# Patient Record
Sex: FEMALE | Race: WHITE | Employment: OTHER | URBAN - METROPOLITAN AREA
[De-identification: names, ages, dates, MRNs, and addresses within clinical notes are randomized per-mention and may not be internally consistent; named-entity substitution may affect disease eponyms.]

---

## 2019-03-01 LAB — MAMMOGRAPHY, EXTERNAL: NORMAL

## 2022-08-24 LAB — DIABETIC RETINOPATHY: POSITIVE

## 2022-10-22 LAB
ALBUMIN SERPL-MCNC: NORMAL G/DL
ALP BLD-CCNC: NORMAL U/L
ALT SERPL-CCNC: NORMAL U/L
ANION GAP SERPL CALCULATED.3IONS-SCNC: NORMAL MMOL/L
AST SERPL-CCNC: NORMAL U/L
AVERAGE GLUCOSE: 151
BILIRUB SERPL-MCNC: NORMAL MG/DL
BUN BLDV-MCNC: NORMAL MG/DL
CALCIUM SERPL-MCNC: NORMAL MG/DL
CHLORIDE BLD-SCNC: NORMAL MMOL/L
CHOLESTEROL, TOTAL: 115 MG/DL
CHOLESTEROL/HDL RATIO: NORMAL
CO2: NORMAL
CREAT SERPL-MCNC: NORMAL MG/DL
EGFR: 16.8
GLUCOSE BLD-MCNC: NORMAL MG/DL
HBA1C MFR BLD: 6.9 %
HDLC SERPL-MCNC: 39 MG/DL (ref 35–70)
LDL CHOLESTEROL CALCULATED: 46 MG/DL (ref 0–160)
NONHDLC SERPL-MCNC: NORMAL MG/DL
POTASSIUM SERPL-SCNC: NORMAL MMOL/L
SODIUM BLD-SCNC: NORMAL MMOL/L
TOTAL PROTEIN: NORMAL
TRIGL SERPL-MCNC: 147 MG/DL
VLDLC SERPL CALC-MCNC: NORMAL MG/DL

## 2023-01-02 LAB
ALBUMIN SERPL-MCNC: NORMAL G/DL
ALP BLD-CCNC: NORMAL U/L
ALT SERPL-CCNC: NORMAL U/L
ANION GAP SERPL CALCULATED.3IONS-SCNC: NORMAL MMOL/L
AST SERPL-CCNC: NORMAL U/L
BASOPHILS ABSOLUTE: ABNORMAL
BASOPHILS RELATIVE PERCENT: ABNORMAL
BILIRUB SERPL-MCNC: NORMAL MG/DL
BUN BLDV-MCNC: NORMAL MG/DL
CALCIUM SERPL-MCNC: NORMAL MG/DL
CHLORIDE BLD-SCNC: NORMAL MMOL/L
CO2: NORMAL
CREAT SERPL-MCNC: NORMAL MG/DL
EOSINOPHILS ABSOLUTE: ABNORMAL
EOSINOPHILS RELATIVE PERCENT: ABNORMAL
FERRITIN: 468.9 NG/ML (ref 9–150)
GFR SERPL CREATININE-BSD FRML MDRD: 16 ML/MIN/{1.73_M2}
GLUCOSE BLD-MCNC: NORMAL MG/DL
HCT VFR BLD CALC: 32.9 % (ref 36–46)
HEMOGLOBIN: 11.3 G/DL (ref 12–16)
LYMPHOCYTES ABSOLUTE: ABNORMAL
LYMPHOCYTES RELATIVE PERCENT: ABNORMAL
MCH RBC QN AUTO: ABNORMAL PG
MCHC RBC AUTO-ENTMCNC: ABNORMAL G/DL
MCV RBC AUTO: ABNORMAL FL
MONOCYTES ABSOLUTE: ABNORMAL
MONOCYTES RELATIVE PERCENT: ABNORMAL
NEUTROPHILS ABSOLUTE: ABNORMAL
NEUTROPHILS RELATIVE PERCENT: ABNORMAL
PDW BLD-RTO: ABNORMAL %
PLATELET # BLD: ABNORMAL 10*3/UL
PMV BLD AUTO: ABNORMAL FL
POTASSIUM SERPL-SCNC: NORMAL MMOL/L
RBC # BLD: 3.55 10^6/ΜL
SODIUM BLD-SCNC: NORMAL MMOL/L
TOTAL PROTEIN: NORMAL
WBC # BLD: 8.4 10^3/ML

## 2023-01-24 ENCOUNTER — OFFICE VISIT (OUTPATIENT)
Dept: PRIMARY CARE CLINIC | Age: 68
End: 2023-01-24

## 2023-01-24 VITALS
TEMPERATURE: 97.4 F | DIASTOLIC BLOOD PRESSURE: 84 MMHG | HEART RATE: 96 BPM | WEIGHT: 199.2 LBS | SYSTOLIC BLOOD PRESSURE: 128 MMHG | OXYGEN SATURATION: 97 % | HEIGHT: 61 IN | BODY MASS INDEX: 37.61 KG/M2

## 2023-01-24 DIAGNOSIS — N18.4 STAGE 4 CHRONIC KIDNEY DISEASE (HCC): ICD-10-CM

## 2023-01-24 DIAGNOSIS — J44.9 CHRONIC OBSTRUCTIVE PULMONARY DISEASE, UNSPECIFIED COPD TYPE (HCC): ICD-10-CM

## 2023-01-24 DIAGNOSIS — N18.4 TYPE 2 DIABETES MELLITUS WITH STAGE 4 CHRONIC KIDNEY DISEASE, WITH LONG-TERM CURRENT USE OF INSULIN (HCC): ICD-10-CM

## 2023-01-24 DIAGNOSIS — Z76.89 ENCOUNTER TO ESTABLISH CARE WITH NEW DOCTOR: Primary | ICD-10-CM

## 2023-01-24 DIAGNOSIS — E11.319 DIABETIC RETINOPATHY ASSOCIATED WITH TYPE 2 DIABETES MELLITUS, MACULAR EDEMA PRESENCE UNSPECIFIED, UNSPECIFIED LATERALITY, UNSPECIFIED RETINOPATHY SEVERITY (HCC): ICD-10-CM

## 2023-01-24 DIAGNOSIS — I50.32 CHRONIC CONGESTIVE HEART FAILURE WITH LEFT VENTRICULAR DIASTOLIC DYSFUNCTION (HCC): ICD-10-CM

## 2023-01-24 DIAGNOSIS — Z85.3 HISTORY OF RIGHT BREAST CANCER: ICD-10-CM

## 2023-01-24 DIAGNOSIS — Z23 NEED FOR IMMUNIZATION AGAINST INFLUENZA: ICD-10-CM

## 2023-01-24 DIAGNOSIS — Z95.828 PORT-A-CATH IN PLACE: ICD-10-CM

## 2023-01-24 DIAGNOSIS — Z79.4 TYPE 2 DIABETES MELLITUS WITH STAGE 4 CHRONIC KIDNEY DISEASE, WITH LONG-TERM CURRENT USE OF INSULIN (HCC): ICD-10-CM

## 2023-01-24 DIAGNOSIS — E11.22 TYPE 2 DIABETES MELLITUS WITH STAGE 4 CHRONIC KIDNEY DISEASE, WITH LONG-TERM CURRENT USE OF INSULIN (HCC): ICD-10-CM

## 2023-01-24 DIAGNOSIS — E66.01 CLASS 2 SEVERE OBESITY DUE TO EXCESS CALORIES WITH SERIOUS COMORBIDITY AND BODY MASS INDEX (BMI) OF 37.0 TO 37.9 IN ADULT (HCC): ICD-10-CM

## 2023-01-24 DIAGNOSIS — I25.10 CORONARY ARTERY DISEASE INVOLVING NATIVE CORONARY ARTERY OF NATIVE HEART WITHOUT ANGINA PECTORIS: ICD-10-CM

## 2023-01-24 DIAGNOSIS — G47.33 OSA ON CPAP: ICD-10-CM

## 2023-01-24 DIAGNOSIS — Z99.89 OSA ON CPAP: ICD-10-CM

## 2023-01-24 DIAGNOSIS — E11.42 DIABETIC PERIPHERAL NEUROPATHY (HCC): ICD-10-CM

## 2023-01-24 DIAGNOSIS — Z12.31 SCREENING MAMMOGRAM FOR BREAST CANCER: ICD-10-CM

## 2023-01-24 PROBLEM — K55.059 ACUTE VASCULAR INSUFFICIENCY OF INTESTINE (HCC): Status: RESOLVED | Noted: 2023-01-24 | Resolved: 2023-01-24

## 2023-01-24 PROBLEM — H40.9 GLAUCOMA: Status: ACTIVE | Noted: 2023-01-24

## 2023-01-24 PROBLEM — D50.9 CHRONIC IRON DEFICIENCY ANEMIA: Status: ACTIVE | Noted: 2023-01-24

## 2023-01-24 PROBLEM — E66.9 OBESITY: Status: ACTIVE | Noted: 2023-01-24

## 2023-01-24 PROBLEM — H90.3 SENSORY HEARING LOSS, BILATERAL: Status: ACTIVE | Noted: 2023-01-24

## 2023-01-24 PROBLEM — E07.9 DISORDER OF THYROID GLAND: Status: ACTIVE | Noted: 2023-01-24

## 2023-01-24 PROBLEM — G47.00 INSOMNIA: Status: ACTIVE | Noted: 2023-01-24

## 2023-01-24 PROBLEM — E03.9 ACQUIRED HYPOTHYROIDISM: Status: ACTIVE | Noted: 2023-01-24

## 2023-01-24 PROBLEM — E53.8 COBALAMIN DEFICIENCY: Status: ACTIVE | Noted: 2023-01-24

## 2023-01-24 PROBLEM — S83.249A TEAR OF MEDIAL MENISCUS OF KNEE: Status: ACTIVE | Noted: 2017-06-19

## 2023-01-24 PROBLEM — M19.90 ARTHRITIS: Status: ACTIVE | Noted: 2023-01-24

## 2023-01-24 PROBLEM — R55 SYNCOPE: Status: ACTIVE | Noted: 2022-12-05

## 2023-01-24 PROBLEM — E11.9 TYPE 2 DIABETES MELLITUS (HCC): Status: ACTIVE | Noted: 2023-01-24

## 2023-01-24 PROBLEM — H25.9 AGE-RELATED CATARACT: Status: ACTIVE | Noted: 2023-01-24

## 2023-01-24 PROBLEM — K57.30 DIVERTICULOSIS OF LARGE INTESTINE WITHOUT PERFORATION OR ABSCESS WITHOUT BLEEDING: Status: ACTIVE | Noted: 2023-01-24

## 2023-01-24 PROBLEM — C50.919 MALIGNANT TUMOR OF BREAST (HCC): Status: ACTIVE | Noted: 2023-01-24

## 2023-01-24 PROBLEM — S82.001A CLOSED FRACTURE OF RIGHT PATELLA: Status: ACTIVE | Noted: 2022-12-12

## 2023-01-24 PROBLEM — H25.9 AGE-RELATED CATARACT: Status: RESOLVED | Noted: 2023-01-24 | Resolved: 2023-01-24

## 2023-01-24 PROBLEM — K55.059 ACUTE VASCULAR INSUFFICIENCY OF INTESTINE (HCC): Status: ACTIVE | Noted: 2023-01-24

## 2023-01-24 PROBLEM — Z97.3 WEARS GLASSES: Status: ACTIVE | Noted: 2022-12-05

## 2023-01-24 PROBLEM — E66.812 CLASS 2 SEVERE OBESITY DUE TO EXCESS CALORIES WITH SERIOUS COMORBIDITY AND BODY MASS INDEX (BMI) OF 37.0 TO 37.9 IN ADULT: Status: ACTIVE | Noted: 2023-01-24

## 2023-01-24 PROBLEM — I50.30 CONGESTIVE HEART FAILURE WITH LEFT VENTRICULAR DIASTOLIC DYSFUNCTION (HCC): Status: ACTIVE | Noted: 2022-09-20

## 2023-01-24 PROBLEM — S82.001A CLOSED FRACTURE OF RIGHT PATELLA: Status: RESOLVED | Noted: 2022-12-12 | Resolved: 2023-01-24

## 2023-01-24 PROBLEM — E78.49 OTHER HYPERLIPIDEMIA: Status: ACTIVE | Noted: 2023-01-24

## 2023-01-24 PROBLEM — E87.6 HYPOKALEMIA: Status: ACTIVE | Noted: 2023-01-11

## 2023-01-24 PROBLEM — S83.249A TEAR OF MEDIAL MENISCUS OF KNEE: Status: RESOLVED | Noted: 2017-06-19 | Resolved: 2023-01-24

## 2023-01-24 PROBLEM — K57.30 DIVERTICULOSIS OF LARGE INTESTINE WITHOUT PERFORATION OR ABSCESS WITHOUT BLEEDING: Status: RESOLVED | Noted: 2023-01-24 | Resolved: 2023-01-24

## 2023-01-24 PROBLEM — I25.118 CORONARY ARTERY DISEASE OF NATIVE ARTERY OF NATIVE HEART WITH STABLE ANGINA PECTORIS (HCC): Status: ACTIVE | Noted: 2023-01-24

## 2023-01-24 RX ORDER — LORAZEPAM 0.5 MG/1
0.5 TABLET ORAL
COMMUNITY
Start: 2020-11-03

## 2023-01-24 RX ORDER — DULOXETIN HYDROCHLORIDE 60 MG/1
CAPSULE, DELAYED RELEASE ORAL
COMMUNITY
Start: 2020-09-29

## 2023-01-24 RX ORDER — POTASSIUM CHLORIDE 1500 MG/1
TABLET, FILM COATED, EXTENDED RELEASE ORAL
COMMUNITY
Start: 2023-01-18

## 2023-01-24 RX ORDER — AMLODIPINE BESYLATE 10 MG/1
TABLET ORAL
COMMUNITY
Start: 2021-02-22

## 2023-01-24 RX ORDER — ISOSORBIDE MONONITRATE 60 MG/1
TABLET, EXTENDED RELEASE ORAL
COMMUNITY
Start: 2009-11-25

## 2023-01-24 RX ORDER — ICOSAPENT ETHYL 1000 MG/1
CAPSULE ORAL
COMMUNITY
Start: 2019-10-25

## 2023-01-24 RX ORDER — VITAMINS B1,B2,B3,B5,AND B6 100-2MG/ML
VIAL (ML) INJECTION
COMMUNITY

## 2023-01-24 RX ORDER — HYDRALAZINE HYDROCHLORIDE 50 MG/1
TABLET, FILM COATED ORAL
COMMUNITY
Start: 2021-02-22

## 2023-01-24 RX ORDER — ASPIRIN 81 MG/1
81 TABLET, CHEWABLE ORAL
COMMUNITY
Start: 2020-10-06

## 2023-01-24 RX ORDER — RANOLAZINE 500 MG/1
TABLET, EXTENDED RELEASE ORAL
COMMUNITY
Start: 2020-09-29

## 2023-01-24 RX ORDER — INSULIN HUMAN 500 [IU]/ML
INJECTION, SOLUTION SUBCUTANEOUS
COMMUNITY

## 2023-01-24 RX ORDER — BRIMONIDINE TARTRATE 2 MG/ML
SOLUTION/ DROPS OPHTHALMIC
COMMUNITY

## 2023-01-24 RX ORDER — SPIRONOLACTONE 25 MG/1
TABLET ORAL
COMMUNITY

## 2023-01-24 RX ORDER — ATORVASTATIN CALCIUM 40 MG/1
TABLET, FILM COATED ORAL
COMMUNITY

## 2023-01-24 RX ORDER — ACETAMINOPHEN 325 MG/1
TABLET ORAL
COMMUNITY
Start: 2019-11-01

## 2023-01-24 RX ORDER — FUROSEMIDE 20 MG/1
TABLET ORAL
COMMUNITY

## 2023-01-24 RX ORDER — NITROGLYCERIN 0.4 MG/1
TABLET SUBLINGUAL
COMMUNITY

## 2023-01-24 RX ORDER — LATANOPROST 50 UG/ML
SOLUTION/ DROPS OPHTHALMIC
COMMUNITY
Start: 2020-09-29

## 2023-01-24 RX ORDER — LEVOTHYROXINE SODIUM 88 UG/1
TABLET ORAL
COMMUNITY
Start: 2021-02-12

## 2023-01-24 RX ORDER — CARVEDILOL 25 MG/1
TABLET ORAL
COMMUNITY
Start: 2021-02-22

## 2023-01-24 RX ORDER — FLASH GLUCOSE SENSOR
KIT MISCELLANEOUS
COMMUNITY
Start: 2022-12-08

## 2023-01-24 SDOH — ECONOMIC STABILITY: FOOD INSECURITY: WITHIN THE PAST 12 MONTHS, YOU WORRIED THAT YOUR FOOD WOULD RUN OUT BEFORE YOU GOT MONEY TO BUY MORE.: NEVER TRUE

## 2023-01-24 SDOH — ECONOMIC STABILITY: FOOD INSECURITY: WITHIN THE PAST 12 MONTHS, THE FOOD YOU BOUGHT JUST DIDN'T LAST AND YOU DIDN'T HAVE MONEY TO GET MORE.: NEVER TRUE

## 2023-01-24 ASSESSMENT — PATIENT HEALTH QUESTIONNAIRE - PHQ9
1. LITTLE INTEREST OR PLEASURE IN DOING THINGS: 1
SUM OF ALL RESPONSES TO PHQ QUESTIONS 1-9: 1
2. FEELING DOWN, DEPRESSED OR HOPELESS: 0
SUM OF ALL RESPONSES TO PHQ QUESTIONS 1-9: 1
SUM OF ALL RESPONSES TO PHQ QUESTIONS 1-9: 1
SUM OF ALL RESPONSES TO PHQ9 QUESTIONS 1 & 2: 1
SUM OF ALL RESPONSES TO PHQ QUESTIONS 1-9: 1

## 2023-01-24 ASSESSMENT — SOCIAL DETERMINANTS OF HEALTH (SDOH): HOW HARD IS IT FOR YOU TO PAY FOR THE VERY BASICS LIKE FOOD, HOUSING, MEDICAL CARE, AND HEATING?: NOT VERY HARD

## 2023-01-24 NOTE — Clinical Note
PCP records [address] [facility] from [facility] 1/2/23

## 2023-01-24 NOTE — PROGRESS NOTES
NEW PRIMARY CARE VISIT    23  Name: Yomi Fair   : 1955   Age: 79 y.o.   Sex: female        Assessment & Plan:     Problem List Items Addressed This Visit          Circulatory    Congestive heart failure with left ventricular diastolic dysfunction (HCC)     Euvolemic on exam  Follows with cardio, requested records  Continue carvedilol, Lasix, hydralazine, Imdur, spironolactone         Relevant Medications    atorvastatin (LIPITOR) 40 MG tablet    carvedilol (COREG) 25 MG tablet    hydrALAZINE (APRESOLINE) 50 MG tablet    Icosapent Ethyl (VASCEPA) 1 g CAPS capsule    isosorbide mononitrate (IMDUR) 60 MG extended release tablet    aspirin 81 MG chewable tablet    nitroGLYCERIN (NITROSTAT) 0.4 MG SL tablet    ranolazine (RANEXA) 500 MG extended release tablet    spironolactone (ALDACTONE) 25 MG tablet    amLODIPine (NORVASC) 10 MG tablet    furosemide (LASIX) 20 MG tablet    Coronary artery disease involving native coronary artery of native heart without angina pectoris     Follows with cardio  Continue carvedilol, Imdur, Ranexa, atorvastatin, Vascepa         Relevant Medications    atorvastatin (LIPITOR) 40 MG tablet    carvedilol (COREG) 25 MG tablet    hydrALAZINE (APRESOLINE) 50 MG tablet    Icosapent Ethyl (VASCEPA) 1 g CAPS capsule    isosorbide mononitrate (IMDUR) 60 MG extended release tablet    aspirin 81 MG chewable tablet    nitroGLYCERIN (NITROSTAT) 0.4 MG SL tablet    ranolazine (RANEXA) 500 MG extended release tablet    spironolactone (ALDACTONE) 25 MG tablet    amLODIPine (NORVASC) 10 MG tablet    furosemide (LASIX) 20 MG tablet       Respiratory    PATTIE on CPAP     Benefits from CPAP  Requested records for previous settings  New CPAP order placed         Relevant Orders    DME Order for CPAP as OP    Chronic obstructive lung disease ([facility]     Controlled  Does not require treatment at this time            Endocrine    Type 2 diabetes mellitus with stage 4 chronic kidney disease, with long-term current use of insulin (HCC)     Chronic, controlled however with multiple complications  Follows with endo, requested records  Continue insulin per endo         Relevant Medications    insulin regular human (HUMULIN R) 500 UNIT/ML concentrated injection vial    Diabetic retinopathy associated with type 2 diabetes mellitus ([facility]     Follows with ophtho, requested records         Relevant Medications    insulin regular human (HUMULIN R) 500 UNIT/ML concentrated injection vial    Diabetic peripheral neuropathy ([facility]     Follows with podiatry, requested records         Relevant Medications    insulin regular human (HUMULIN R) 500 UNIT/ML concentrated injection vial    LORazepam (ATIVAN) 0.5 MG tablet    DULoxetine (CYMBALTA) 60 MG extended release capsule       Genitourinary    Stage 4 chronic kidney disease ([facility]     Follows with nephro, requested records            Other    Class 2 severe obesity due to excess calories with serious comorbidity and body mass index (BMI) of 37.0 to 37.9 in adult ([facility]     Continues to work on lowering weight         Relevant Medications    insulin regular human (HUMULIN R) 500 UNIT/ML concentrated injection vial    History of right breast cancer    Relevant Orders    FAITH LALITHA DIGITAL SCREEN BILATERAL PER PROTOCOL    Port-A-Cath in place     Power Port right chest          Other Visit Diagnoses       Encounter to establish care with new doctor    -  Primary    History reviewed and updated    Screening mammogram for breast cancer        Relevant Orders    FAITH LALITHA DIGITAL SCREEN BILATERAL PER PROTOCOL    Need for immunization against influenza        Relevant Orders    Influenza, FLUAD, (age 72 y+), IM, PF, 0.5 mL (Completed)          Counseled patient regarding above diagnosis, including possible risks and complications, especially if left uncontrolled.   Counseled patient as appropriate and relevant regarding any possible side effects, risks, and alternatives to treatment; the patient verbalizes understanding, and is in agreement with the plan as detailed above. All educational materials and instructions were discussed and included on the After Visit Summary. All questions answered to the patient's satisfaction. The patient was advised to call for any concerns prior to next appointment. Return in about 3 months (around 4/24/2023). Subjective:     Chief Complaint   Patient presents with    New Patient     No complaints or concerns shared. She did break her knee cap 4 weeks ago      Patient needs new PCP. Denies concerns today. Review of Systems   Constitutional:  Negative for diaphoresis and fatigue. Eyes:  Negative for visual disturbance. Respiratory:  Negative for cough and shortness of breath. Cardiovascular:  Negative for chest pain, palpitations and leg swelling. Endocrine: Negative for polydipsia and polyuria. Musculoskeletal:  Positive for arthralgias (right knee). Neurological:  Positive for light-headedness. Negative for weakness, numbness and headaches. Medical History:   History reviewed and updated as needed.     Patient Active Problem List   Diagnosis    Wears glasses    Syncope    Sensory hearing loss, bilateral    PATTIE on CPAP    Other hyperlipidemia    Class 2 severe obesity due to excess calories with serious comorbidity and body mass index (BMI) of 37.0 to 37.9 in adult [facility])    History of right breast cancer    Insomnia    Hypokalemia    Glaucoma    Essential hypertension    Esophageal reflux    Type 2 diabetes mellitus with stage 4 chronic kidney disease, with long-term current use of insulin (HCC)    Acquired hypothyroidism    Congestive heart failure with left ventricular diastolic dysfunction (HCC)    B12 deficiency    Closed fracture of right patella    Chronic obstructive lung disease (HCC)    Stage 4 chronic kidney disease (HCC)    Chronic iron deficiency anemia    Arthritis    Coronary artery disease of native artery of native heart with stable angina pectoris ([facility]    Diabetic retinopathy associated with type 2 diabetes mellitus ([facility]    Diabetic peripheral neuropathy ([facility]      Past Medical History:   Diagnosis Date    Acute vascular insufficiency of intestine ([facility] 01/24/2023    Age-related cataract 01/24/2023    Closed fracture of right femur (HCC)     Closed fracture of right patella 12/12/2022    Diverticulosis of large intestine without perforation or abscess without bleeding 01/24/2023    Fracture of right toe     Gouty arthropathy 09/03/2008    Malignant neoplasm of breast ([facility] 1989    right    Nasal bone fracture     Tear of medial meniscus of right knee 06/19/2017     Past Surgical History:   Procedure Laterality Date    CARPAL TUNNEL RELEASE Bilateral     CATARACT EXTRACTION W/ INTRAOCULAR LENS  IMPLANT, BILATERAL Bilateral     FEMUR FRACTURE SURGERY Right     HYSTERECTOMY, TOTAL ABDOMINAL (CERVIX REMOVED)      AUB-benign    MASTECTOMY Right     TONSILLECTOMY AND ADENOIDECTOMY       Family History   Problem Relation Age of Onset    Heart Disease Mother     Diabetes Mother     Thyroid Disease Mother     Heart Disease Father     Diabetes Father     No Known Problems Sister     No Known Problems Sister     No Known Problems Sister     Cancer Sister         metastatic, unknown type    No Known Problems Daughter     No Known Problems Daughter     No Known Problems Daughter     Other Daughter         neurologic disorder- dx pending    Substance Abuse Brother      Medications:     Current Outpatient Medications:     acetaminophen (TYLENOL) 325 MG tablet, Every 6 hours as needed, Disp: , Rfl:     atorvastatin (LIPITOR) 40 MG tablet, atorvastatin 40 mg tablet  TAKE 1 TABLET BY MOUTH DAILY, Disp: , Rfl:     brimonidine (ALPHAGAN) 0.2 % ophthalmic solution, , Disp: , Rfl:     carvedilol (COREG) 25 MG tablet, carvedilol 25 mg tablet  TAKE 1 TABLET BY MOUTH TWICE DAILY, Disp: , Rfl:     hydrALAZINE (APRESOLINE) 50 MG tablet, hydralazine 50 mg tablet  Take 1 tablet twice a day by oral route., Disp: , Rfl:     Icosapent Ethyl (VASCEPA) 1 g CAPS capsule, , Disp: , Rfl:     insulin regular human (HUMULIN R) 500 UNIT/ML concentrated injection vial, Humulin R U-500 (Concentrated) Insulin 500 unit/mL subcutaneous soln, Disp: , Rfl:     isosorbide mononitrate (IMDUR) 60 MG extended release tablet, isosorbide mononitrate ER 60 mg tablet,extended release 24 hr  TAKE 2 TABLETS BY MOUTH EVERY NIGHT AT BEDTIME, Disp: , Rfl:     latanoprost (XALATAN) 0.005 % ophthalmic solution, , Disp: , Rfl:     levothyroxine (SYNTHROID) 88 MCG tablet, levothyroxine 88 mcg tablet  TAKE 1 TABLET BY MOUTH EVERY DAY, Disp: , Rfl:     LORazepam (ATIVAN) 0.5 MG tablet, 0.5 mg., Disp: , Rfl:     potassium chloride (KLOR-CON M) 20 MEQ TBCR extended release tablet, TAKE 1 TABLET BY MOUTH EVERY DAY, Disp: , Rfl:     aspirin 81 MG chewable tablet, 81 mg, Disp: , Rfl:     vitamin D 25 MCG (1000 UT) CAPS, Take by mouth, Disp: , Rfl:     Continuous Blood Gluc Sensor (FREESTYLE JOELLE 2 SENSOR) MISC, CHANGE SENSOR EVERY 14 DAYS AS DIRECTED, Disp: , Rfl:     nitroGLYCERIN (NITROSTAT) 0.4 MG SL tablet, Nitrostat 0.4 mg sublingual tablet, Disp: , Rfl:     ranolazine (RANEXA) 500 MG extended release tablet, ranolazine  mg tablet,extended release,12 hr  TAKE 1 TABLET BY MOUTH TWICE DAILY, Disp: , Rfl:     spironolactone (ALDACTONE) 25 MG tablet, spironolactone 25 mg tablet  TAKE 1/2 TABLET BY MOUTH EVERY DAY, Disp: , Rfl:     DULoxetine (CYMBALTA) 60 MG extended release capsule, duloxetine 60 mg capsule,delayed release  TAKE 1 CAPSULE BY MOUTH EVERY DAY, Disp: , Rfl:     amLODIPine (NORVASC) 10 MG tablet, amlodipine 10 mg tablet  TAKE 1 TABLET BY MOUTH DAILY, Disp: , Rfl:     b-complex INJ, by Other route, Disp: , Rfl:     furosemide (LASIX) 20 MG tablet, furosemide 20 mg tablet  Take 1 tablet every day by oral route., Disp: , Rfl:     Allergies:      Allergies   Allergen Reactions    Latex Adhesive Tape     Niacin Itching       Social History:     Social History     Socioeconomic History    Marital status:      Spouse name: Not on file    Number of children: Not on file    Years of education: Not on file    Highest education level: Not on file   Occupational History    Not on file   Tobacco Use    Smoking status: Never    Smokeless tobacco: Never   Vaping Use    Vaping Use: Never used   Substance and Sexual Activity    Alcohol use: Never    Drug use: Never    Sexual activity: Not on file   Other Topics Concern    Not on file   Social History Narrative    Not on file     Social Determinants of Health     Financial Resource Strain: Low Risk     Difficulty of Paying Living Expenses: Not very hard   Food Insecurity: No Food Insecurity    Worried About Running Out of Food in the Last Year: Never true    Ran Out of Food in the Last Year: Never true   Transportation Needs: Not on file   Physical Activity: Not on file   Stress: Not on file   Social Connections: Not on file   Intimate Partner Violence: Not on file   Housing Stability: Not on file     Physical Exam:     Vitals:    01/24/23 1457   BP: 128/84   Site: Left Wrist   Position: Sitting   Cuff Size: Small Adult   Pulse: 96   Temp: 97.4 °F (36.3 °C)   SpO2: 97%   Weight: 199 lb 3.2 oz (90.4 kg)   Height: 5' 1\" (1.549 m)     BP Readings from Last 3 Encounters:   01/24/23 128/84     Wt Readings from Last 3 Encounters:   01/24/23 199 lb 3.2 oz (90.4 kg)      Physical Exam  Vitals and nursing note reviewed. Constitutional:       General: She is not in acute distress. Appearance: Normal appearance. She is obese. She is not ill-appearing or diaphoretic. Cardiovascular:      Rate and Rhythm: Normal rate and regular rhythm. Heart sounds: Normal heart sounds. Pulmonary:      Effort: Pulmonary effort is normal. No respiratory distress. Breath sounds: Normal breath sounds. Musculoskeletal:      Right lower leg: No edema.       Left lower leg: No edema.   Skin:     General: Skin is warm and dry.   Neurological:      Mental Status: She is alert and oriented to person, place, and time.   Psychiatric:         Mood and Affect: Mood normal.         Behavior: Behavior normal.     Testing:     Orders Placed This Encounter   Procedures    FAITH LALITHA DIGITAL SCREEN BILATERAL PER PROTOCOL     Further imaging can be completed per [facility] protocol     Standing Status:   Future     Standing Expiration Date:   3/24/2024     Scheduling Instructions:      [facility]     Order Specific Question:   Where will the exam be performed?     Answer:   [facility]    Influenza, FLUAD, (age 65 y+), IM, PF, 0.5 mL    DME Order for CPAP as OP     CPAP; need settings from previous order    Heated Humidifier    Heated Tubing with Integrated Element 1 per 3 months    Nasal Pillow 1 per 3 months and Replacement Pillows 2 per month    Headgear 1 per 6 months, Chin Strap 1 per 6 months, Disposable Filters 2 per month, Non-disposable Filters 1 per 6 months, Chambers 1 per 6 months and Other Related Supplies.        Replace supplies and accessories as needed.  Patient may choose another interface for compliance and comfort.  Comments:   Diagnosis: PATTIE  Length of need: Lifetime     Scheduling Instructions:      [facility]     No results found for this or any previous visit (from the past 24 hour(s)).

## 2023-02-01 NOTE — PROGRESS NOTES
[facility] records received:  January 2023 lab results, patients last mammo/2019, [facility] last two progress notes. Katina Mcneil obtained and placed in your box. -Release faxed to other facilities listed per 's request, will follow up on records.  (Lots more to come)

## 2023-02-14 NOTE — PROGRESS NOTES
Podiatry Dr. Posada Meals records received and placed in clinicals box.      (Only records left we are waiting on are PCP records from [place], [place].)

## 2023-02-14 NOTE — PROGRESS NOTES
BHAVIN faxed a second time to [facility], they changed their phone system recently and I was unable to speak with anyone. Will try to call again at a later time.

## 2023-02-23 NOTE — PROGRESS NOTES
Left voicemail for Sharma Brittle [facility] regarding several attempts to obtain prior records with no luck, requesting call back to acknowledge my message left today. Awaiting a call back.

## 2023-02-23 NOTE — PROGRESS NOTES
Spoke with BETHANY, [address] today, confirmed they had attempted once to fax prior PCP records and it failed sending. Requested they mail me paper records. Confirmed she would do so asap. Will follow up on records via mail.

## 2023-02-26 PROBLEM — Z91.81 AT HIGH RISK FOR FALLS: Status: ACTIVE | Noted: 2023-02-26

## 2023-02-26 ASSESSMENT — ENCOUNTER SYMPTOMS
SHORTNESS OF BREATH: 0
COUGH: 0

## 2023-02-27 NOTE — ASSESSMENT & PLAN NOTE
Euvolemic on exam  Follows with cardio, requested records  Continue carvedilol, Lasix, hydralazine, Imdur, spironolactone

## 2023-02-27 NOTE — ASSESSMENT & PLAN NOTE
Chronic, controlled however with multiple complications  Follows with endo, requested records  Continue insulin per endo

## 2023-03-31 PROBLEM — R32 UNSPECIFIED URINARY INCONTINENCE: Status: ACTIVE | Noted: 2023-03-31

## 2023-04-22 LAB
ALBUMIN SERPL-MCNC: NORMAL G/DL
BASOPHILS ABSOLUTE: NORMAL
BASOPHILS RELATIVE PERCENT: NORMAL
BUN / CREAT RATIO: NORMAL
BUN BLDV-MCNC: NORMAL MG/DL
CALCIUM SERPL-MCNC: NORMAL MG/DL
CHLORIDE BLD-SCNC: NORMAL MMOL/L
CO2: NORMAL
CREAT SERPL-MCNC: NORMAL MG/DL
EOSINOPHILS ABSOLUTE: NORMAL
EOSINOPHILS RELATIVE PERCENT: NORMAL
GLUCOSE: NORMAL
HCT VFR BLD CALC: NORMAL %
HEMOGLOBIN: NORMAL
LYMPHOCYTES ABSOLUTE: NORMAL
LYMPHOCYTES RELATIVE PERCENT: NORMAL
MCH RBC QN AUTO: NORMAL PG
MCHC RBC AUTO-ENTMCNC: NORMAL G/DL
MCV RBC AUTO: NORMAL FL
MONOCYTES ABSOLUTE: NORMAL
MONOCYTES RELATIVE PERCENT: NORMAL
NEUTROPHILS ABSOLUTE: NORMAL
NEUTROPHILS RELATIVE PERCENT: NORMAL
PDW BLD-RTO: NORMAL %
PHOSPHORUS: NORMAL
PLATELET # BLD: NORMAL 10*3/UL
PMV BLD AUTO: NORMAL FL
POTASSIUM SERPL-SCNC: NORMAL MMOL/L
PTH INTACT: 117.6
RBC # BLD: NORMAL 10*6/UL
SODIUM BLD-SCNC: NORMAL MMOL/L
WBC # BLD: NORMAL 10*3/UL

## 2023-04-24 LAB — FERRITIN: 326.6 NG/ML (ref 9–150)

## 2023-04-25 ENCOUNTER — OFFICE VISIT (OUTPATIENT)
Dept: PRIMARY CARE CLINIC | Age: 68
End: 2023-04-25
Payer: MEDICARE

## 2023-04-25 ENCOUNTER — OFFICE VISIT (OUTPATIENT)
Dept: PRIMARY CARE CLINIC | Age: 68
End: 2023-04-25

## 2023-04-25 VITALS
DIASTOLIC BLOOD PRESSURE: 88 MMHG | SYSTOLIC BLOOD PRESSURE: 136 MMHG | WEIGHT: 199.6 LBS | HEART RATE: 77 BPM | OXYGEN SATURATION: 99 % | TEMPERATURE: 97.3 F | HEIGHT: 61 IN | BODY MASS INDEX: 37.69 KG/M2

## 2023-04-25 VITALS
HEIGHT: 61 IN | SYSTOLIC BLOOD PRESSURE: 136 MMHG | WEIGHT: 199.6 LBS | BODY MASS INDEX: 37.69 KG/M2 | HEART RATE: 77 BPM | DIASTOLIC BLOOD PRESSURE: 88 MMHG | TEMPERATURE: 97.3 F | OXYGEN SATURATION: 99 %

## 2023-04-25 DIAGNOSIS — E11.22 TYPE 2 DIABETES MELLITUS WITH STAGE 4 CHRONIC KIDNEY DISEASE, WITH LONG-TERM CURRENT USE OF INSULIN (HCC): Primary | ICD-10-CM

## 2023-04-25 DIAGNOSIS — Z79.4 TYPE 2 DIABETES MELLITUS WITH STAGE 4 CHRONIC KIDNEY DISEASE, WITH LONG-TERM CURRENT USE OF INSULIN (HCC): Primary | ICD-10-CM

## 2023-04-25 DIAGNOSIS — Z12.31 SCREENING MAMMOGRAM FOR BREAST CANCER: ICD-10-CM

## 2023-04-25 DIAGNOSIS — N39.46 MIXED STRESS AND URGE URINARY INCONTINENCE: ICD-10-CM

## 2023-04-25 DIAGNOSIS — E11.42 DIABETIC PERIPHERAL NEUROPATHY (HCC): ICD-10-CM

## 2023-04-25 DIAGNOSIS — E79.0 ELEVATED BLOOD URIC ACID LEVEL: ICD-10-CM

## 2023-04-25 DIAGNOSIS — Z78.0 ASYMPTOMATIC MENOPAUSAL STATE: ICD-10-CM

## 2023-04-25 DIAGNOSIS — Z13.820 SCREENING FOR OSTEOPOROSIS: ICD-10-CM

## 2023-04-25 DIAGNOSIS — N18.4 TYPE 2 DIABETES MELLITUS WITH STAGE 4 CHRONIC KIDNEY DISEASE, WITH LONG-TERM CURRENT USE OF INSULIN (HCC): Primary | ICD-10-CM

## 2023-04-25 DIAGNOSIS — M1A.3791 CHRONIC GOUT DUE TO RENAL IMPAIRMENT INVOLVING FOOT WITH TOPHUS, UNSPECIFIED LATERALITY: ICD-10-CM

## 2023-04-25 DIAGNOSIS — S82.001G CLOSED NONDISPLACED FRACTURE OF RIGHT PATELLA WITH DELAYED HEALING, UNSPECIFIED FRACTURE MORPHOLOGY, SUBSEQUENT ENCOUNTER: ICD-10-CM

## 2023-04-25 DIAGNOSIS — Z71.89 COUNSELING REGARDING ADVANCED DIRECTIVES AND GOALS OF CARE: ICD-10-CM

## 2023-04-25 DIAGNOSIS — H91.93 HEARING DIFFICULTY OF BOTH EARS: ICD-10-CM

## 2023-04-25 DIAGNOSIS — Z00.00 INITIAL MEDICARE ANNUAL WELLNESS VISIT: Primary | ICD-10-CM

## 2023-04-25 DIAGNOSIS — R29.6 RECURRENT FALLS: ICD-10-CM

## 2023-04-25 DIAGNOSIS — M19.90 ARTHRITIS: ICD-10-CM

## 2023-04-25 DIAGNOSIS — G25.0 ESSENTIAL TREMOR: ICD-10-CM

## 2023-04-25 PROBLEM — M1A.3790: Status: ACTIVE | Noted: 2023-04-25

## 2023-04-25 PROCEDURE — 3074F SYST BP LT 130 MM HG: CPT | Performed by: STUDENT IN AN ORGANIZED HEALTH CARE EDUCATION/TRAINING PROGRAM

## 2023-04-25 PROCEDURE — 3078F DIAST BP <80 MM HG: CPT | Performed by: STUDENT IN AN ORGANIZED HEALTH CARE EDUCATION/TRAINING PROGRAM

## 2023-04-25 PROCEDURE — 1123F ACP DISCUSS/DSCN MKR DOCD: CPT | Performed by: STUDENT IN AN ORGANIZED HEALTH CARE EDUCATION/TRAINING PROGRAM

## 2023-04-25 PROCEDURE — G0438 PPPS, INITIAL VISIT: HCPCS | Performed by: STUDENT IN AN ORGANIZED HEALTH CARE EDUCATION/TRAINING PROGRAM

## 2023-04-25 RX ORDER — PEN NEEDLE, DIABETIC 29 G X1/2"
NEEDLE, DISPOSABLE MISCELLANEOUS
COMMUNITY
Start: 2023-02-15

## 2023-04-25 RX ORDER — GABAPENTIN 100 MG/1
100 CAPSULE ORAL DAILY
Qty: 90 CAPSULE | Refills: 0 | Status: SHIPPED | OUTPATIENT
Start: 2023-04-25 | End: 2023-07-24

## 2023-04-25 RX ORDER — FUROSEMIDE 40 MG/1
80 TABLET ORAL
COMMUNITY

## 2023-04-25 SDOH — ECONOMIC STABILITY: HOUSING INSECURITY
IN THE LAST 12 MONTHS, WAS THERE A TIME WHEN YOU DID NOT HAVE A STEADY PLACE TO SLEEP OR SLEPT IN A SHELTER (INCLUDING NOW)?: NO

## 2023-04-25 SDOH — ECONOMIC STABILITY: FOOD INSECURITY: WITHIN THE PAST 12 MONTHS, YOU WORRIED THAT YOUR FOOD WOULD RUN OUT BEFORE YOU GOT MONEY TO BUY MORE.: NEVER TRUE

## 2023-04-25 SDOH — ECONOMIC STABILITY: FOOD INSECURITY: WITHIN THE PAST 12 MONTHS, THE FOOD YOU BOUGHT JUST DIDN'T LAST AND YOU DIDN'T HAVE MONEY TO GET MORE.: NEVER TRUE

## 2023-04-25 SDOH — ECONOMIC STABILITY: INCOME INSECURITY: HOW HARD IS IT FOR YOU TO PAY FOR THE VERY BASICS LIKE FOOD, HOUSING, MEDICAL CARE, AND HEATING?: NOT VERY HARD

## 2023-04-25 ASSESSMENT — PATIENT HEALTH QUESTIONNAIRE - PHQ9
SUM OF ALL RESPONSES TO PHQ QUESTIONS 1-9: 4
10. IF YOU CHECKED OFF ANY PROBLEMS, HOW DIFFICULT HAVE THESE PROBLEMS MADE IT FOR YOU TO DO YOUR WORK, TAKE CARE OF THINGS AT HOME, OR GET ALONG WITH OTHER PEOPLE: 0
SUM OF ALL RESPONSES TO PHQ9 QUESTIONS 1 & 2: 0
3. TROUBLE FALLING OR STAYING ASLEEP: 3
4. FEELING TIRED OR HAVING LITTLE ENERGY: 0
SUM OF ALL RESPONSES TO PHQ QUESTIONS 1-9: 4
7. TROUBLE CONCENTRATING ON THINGS, SUCH AS READING THE NEWSPAPER OR WATCHING TELEVISION: 0
2. FEELING DOWN, DEPRESSED OR HOPELESS: 0
SUM OF ALL RESPONSES TO PHQ QUESTIONS 1-9: 4
SUM OF ALL RESPONSES TO PHQ QUESTIONS 1-9: 4
9. THOUGHTS THAT YOU WOULD BE BETTER OFF DEAD, OR OF HURTING YOURSELF: 0
6. FEELING BAD ABOUT YOURSELF - OR THAT YOU ARE A FAILURE OR HAVE LET YOURSELF OR YOUR FAMILY DOWN: 0
1. LITTLE INTEREST OR PLEASURE IN DOING THINGS: 0
5. POOR APPETITE OR OVEREATING: 1
8. MOVING OR SPEAKING SO SLOWLY THAT OTHER PEOPLE COULD HAVE NOTICED. OR THE OPPOSITE, BEING SO FIGETY OR RESTLESS THAT YOU HAVE BEEN MOVING AROUND A LOT MORE THAN USUAL: 0

## 2023-04-25 ASSESSMENT — LIFESTYLE VARIABLES
HOW OFTEN DO YOU HAVE A DRINK CONTAINING ALCOHOL: NEVER
HOW MANY STANDARD DRINKS CONTAINING ALCOHOL DO YOU HAVE ON A TYPICAL DAY: PATIENT DOES NOT DRINK

## 2023-05-07 PROBLEM — D72.810 LYMPHOPENIA: Status: ACTIVE | Noted: 2023-05-07

## 2023-05-07 PROBLEM — D64.9 NORMOCYTIC ANEMIA: Status: ACTIVE | Noted: 2023-05-07

## 2023-05-11 PROBLEM — G25.0 ESSENTIAL TREMOR: Status: ACTIVE | Noted: 2023-05-11

## 2023-05-11 PROBLEM — H91.93 HEARING DIFFICULTY OF BOTH EARS: Status: ACTIVE | Noted: 2023-05-11

## 2023-05-11 ASSESSMENT — ENCOUNTER SYMPTOMS
COUGH: 0
SHORTNESS OF BREATH: 0

## 2023-05-11 NOTE — ASSESSMENT & PLAN NOTE
Likely secondary to other medical issues due to need for diuretics and limited mobility  Provided order for home incontinence supplies, bedside commode FAMILY HISTORY:  Family history of seizure disorder, father:   FH: type 2 diabetes

## 2023-05-11 NOTE — ASSESSMENT & PLAN NOTE
Chronic, controlled with last A1c 6.9  Follows with endocrinology  Continue Humulin R per endo  Trial gabapentin 100 mg daily for neuropathy

## 2023-06-19 ENCOUNTER — TELEPHONE (OUTPATIENT)
Dept: PRIMARY CARE CLINIC | Age: 68
End: 2023-06-19

## 2023-06-19 DIAGNOSIS — Z12.12 SCREENING FOR COLORECTAL CANCER: Primary | ICD-10-CM

## 2023-06-19 DIAGNOSIS — Z86.010 HISTORY OF COLON POLYPS: ICD-10-CM

## 2023-06-19 DIAGNOSIS — Z12.11 SCREENING FOR COLORECTAL CANCER: Primary | ICD-10-CM

## 2023-06-19 NOTE — TELEPHONE ENCOUNTER
----- Message from Suzanne Roberts MD sent at 5/7/2023  7:58 PM EDT -----  Received records, patient is overdue for repeat colonoscopy with Dr. Domonique Alvares.  Is she still following with him or does she need a new referral?

## 2023-06-20 NOTE — TELEPHONE ENCOUNTER
Referral placed in system, Mikki Raya will send today and document, please see referral note for further documentation if needed.

## 2023-07-01 LAB
ALBUMIN SERPL-MCNC: NORMAL G/DL
BASOPHILS ABSOLUTE: NORMAL
BASOPHILS RELATIVE PERCENT: NORMAL
BUN / CREAT RATIO: NORMAL
BUN BLDV-MCNC: NORMAL MG/DL
CALCIUM SERPL-MCNC: NORMAL MG/DL
CHLORIDE BLD-SCNC: NORMAL MMOL/L
CO2: NORMAL
CREAT SERPL-MCNC: NORMAL MG/DL
EOSINOPHILS ABSOLUTE: NORMAL
EOSINOPHILS RELATIVE PERCENT: NORMAL
GFR SERPL CREATININE-BSD FRML MDRD: 21.3 ML/MIN/{1.73_M2}
GLUCOSE: NORMAL
HCT VFR BLD CALC: NORMAL %
HEMOGLOBIN: NORMAL
LYMPHOCYTES ABSOLUTE: NORMAL
LYMPHOCYTES RELATIVE PERCENT: NORMAL
MCH RBC QN AUTO: NORMAL PG
MCHC RBC AUTO-ENTMCNC: NORMAL G/DL
MCV RBC AUTO: NORMAL FL
MONOCYTES ABSOLUTE: NORMAL
MONOCYTES RELATIVE PERCENT: NORMAL
NEUTROPHILS ABSOLUTE: NORMAL
NEUTROPHILS RELATIVE PERCENT: NORMAL
PDW BLD-RTO: NORMAL %
PHOSPHORUS: NORMAL
PLATELET # BLD: NORMAL 10*3/UL
PMV BLD AUTO: NORMAL FL
POTASSIUM SERPL-SCNC: NORMAL MMOL/L
PTH INTACT: 112.7
RBC # BLD: NORMAL 10*6/UL
SODIUM BLD-SCNC: NORMAL MMOL/L
WBC # BLD: NORMAL 10*3/UL

## 2023-07-11 LAB
BASOPHILS ABSOLUTE: NORMAL
BASOPHILS RELATIVE PERCENT: NORMAL
EOSINOPHILS ABSOLUTE: NORMAL
EOSINOPHILS RELATIVE PERCENT: NORMAL
HCT VFR BLD CALC: NORMAL %
HEMOGLOBIN: NORMAL
LYMPHOCYTES ABSOLUTE: NORMAL
LYMPHOCYTES RELATIVE PERCENT: NORMAL
MCH RBC QN AUTO: NORMAL PG
MCHC RBC AUTO-ENTMCNC: NORMAL G/DL
MCV RBC AUTO: NORMAL FL
MONOCYTES ABSOLUTE: NORMAL
MONOCYTES RELATIVE PERCENT: NORMAL
NEUTROPHILS ABSOLUTE: NORMAL
NEUTROPHILS RELATIVE PERCENT: NORMAL
PDW BLD-RTO: NORMAL %
PLATELET # BLD: NORMAL 10*3/UL
PMV BLD AUTO: NORMAL FL
RBC # BLD: NORMAL 10*6/UL
WBC # BLD: NORMAL 10*3/UL

## 2023-07-12 ENCOUNTER — TELEPHONE (OUTPATIENT)
Dept: PRIMARY CARE CLINIC | Age: 68
End: 2023-07-12

## 2023-07-12 DIAGNOSIS — E11.42 DIABETIC PERIPHERAL NEUROPATHY (HCC): ICD-10-CM

## 2023-07-12 DIAGNOSIS — M15.9 PRIMARY OSTEOARTHRITIS INVOLVING MULTIPLE JOINTS: ICD-10-CM

## 2023-07-12 DIAGNOSIS — R29.6 RECURRENT FALLS: Primary | ICD-10-CM

## 2023-07-12 DIAGNOSIS — M1A.3791 CHRONIC GOUT DUE TO RENAL IMPAIRMENT INVOLVING FOOT WITH TOPHUS, UNSPECIFIED LATERALITY: ICD-10-CM

## 2023-07-12 NOTE — TELEPHONE ENCOUNTER
Maury Fermin patients case julian is requesting a script for lift chair and PT kevon for lift chair     Fax. [phone]

## 2023-07-13 PROBLEM — M15.9 PRIMARY OSTEOARTHRITIS INVOLVING MULTIPLE JOINTS: Status: ACTIVE | Noted: 2023-07-13

## 2023-07-13 PROBLEM — M15.0 PRIMARY OSTEOARTHRITIS INVOLVING MULTIPLE JOINTS: Status: ACTIVE | Noted: 2023-07-13

## 2023-07-18 DIAGNOSIS — G25.0 ESSENTIAL TREMOR: ICD-10-CM

## 2023-07-19 RX ORDER — GABAPENTIN 100 MG/1
100 CAPSULE ORAL DAILY
Qty: 90 CAPSULE | Refills: 0 | Status: SHIPPED | OUTPATIENT
Start: 2023-07-19 | End: 2023-10-17

## 2023-07-19 NOTE — TELEPHONE ENCOUNTER
Patients last appointment 4/25/2023.   Patients next scheduled appointment   Future Appointments   Date Time Provider [address]   7/25/2023  3:30 PM Jerri Ring MD [facility]   4/30/2024  3:30 PM Jerri Ring MD [address] [place] [address]

## 2023-07-25 ENCOUNTER — OFFICE VISIT (OUTPATIENT)
Dept: PRIMARY CARE CLINIC | Age: 68
End: 2023-07-25
Payer: MEDICARE

## 2023-07-25 VITALS
HEIGHT: 61 IN | SYSTOLIC BLOOD PRESSURE: 134 MMHG | DIASTOLIC BLOOD PRESSURE: 68 MMHG | TEMPERATURE: 97.7 F | WEIGHT: 192.8 LBS | BODY MASS INDEX: 36.4 KG/M2 | OXYGEN SATURATION: 94 % | HEART RATE: 67 BPM

## 2023-07-25 DIAGNOSIS — E11.42 DIABETIC PERIPHERAL NEUROPATHY (HCC): ICD-10-CM

## 2023-07-25 DIAGNOSIS — I25.10 CORONARY ARTERY DISEASE INVOLVING NATIVE CORONARY ARTERY OF NATIVE HEART WITHOUT ANGINA PECTORIS: ICD-10-CM

## 2023-07-25 DIAGNOSIS — F32.0 MILD MAJOR DEPRESSION (HCC): ICD-10-CM

## 2023-07-25 DIAGNOSIS — F43.22 ADJUSTMENT REACTION WITH ANXIETY: ICD-10-CM

## 2023-07-25 DIAGNOSIS — J06.9 VIRAL URI WITH COUGH: Primary | ICD-10-CM

## 2023-07-25 DIAGNOSIS — M15.9 PRIMARY OSTEOARTHRITIS INVOLVING MULTIPLE JOINTS: ICD-10-CM

## 2023-07-25 DIAGNOSIS — R29.6 RECURRENT FALLS: ICD-10-CM

## 2023-07-25 DIAGNOSIS — M1A.3791 CHRONIC GOUT DUE TO RENAL IMPAIRMENT INVOLVING FOOT WITH TOPHUS, UNSPECIFIED LATERALITY: ICD-10-CM

## 2023-07-25 DIAGNOSIS — J06.9 VIRAL URI WITH COUGH: ICD-10-CM

## 2023-07-25 LAB
INFLUENZA A ANTIGEN, POC: NEGATIVE
INFLUENZA B ANTIGEN, POC: NEGATIVE
LOT EXPIRE DATE: NORMAL
LOT KIT NUMBER: NORMAL
SARS-COV-2, POC: NORMAL
VALID INTERNAL CONTROL: NORMAL
VENDOR AND KIT NAME POC: NORMAL

## 2023-07-25 PROCEDURE — 3074F SYST BP LT 130 MM HG: CPT | Performed by: STUDENT IN AN ORGANIZED HEALTH CARE EDUCATION/TRAINING PROGRAM

## 2023-07-25 PROCEDURE — 1123F ACP DISCUSS/DSCN MKR DOCD: CPT | Performed by: STUDENT IN AN ORGANIZED HEALTH CARE EDUCATION/TRAINING PROGRAM

## 2023-07-25 PROCEDURE — 3078F DIAST BP <80 MM HG: CPT | Performed by: STUDENT IN AN ORGANIZED HEALTH CARE EDUCATION/TRAINING PROGRAM

## 2023-07-25 PROCEDURE — 99214 OFFICE O/P EST MOD 30 MIN: CPT | Performed by: STUDENT IN AN ORGANIZED HEALTH CARE EDUCATION/TRAINING PROGRAM

## 2023-07-25 PROCEDURE — 87428 SARSCOV & INF VIR A&B AG IA: CPT | Performed by: STUDENT IN AN ORGANIZED HEALTH CARE EDUCATION/TRAINING PROGRAM

## 2023-07-25 RX ORDER — FEBUXOSTAT 80 MG/1
80 TABLET, FILM COATED ORAL DAILY
COMMUNITY
Start: 2023-07-19

## 2023-07-25 RX ORDER — OMEPRAZOLE 40 MG/1
CAPSULE, DELAYED RELEASE ORAL
COMMUNITY

## 2023-07-25 RX ORDER — BUSPIRONE HYDROCHLORIDE 5 MG/1
5 TABLET ORAL 3 TIMES DAILY PRN
Qty: 90 TABLET | Refills: 0 | Status: SHIPPED | OUTPATIENT
Start: 2023-07-25

## 2023-07-25 RX ORDER — NITROGLYCERIN 0.4 MG/1
0.4 TABLET SUBLINGUAL EVERY 5 MIN PRN
Qty: 25 TABLET | Refills: 0 | Status: SHIPPED | OUTPATIENT
Start: 2023-07-25

## 2023-07-25 RX ORDER — CHLORPHENIRAMINE MALEATE AND DEXTROMETHORPHAN HYDROBROMIDE 4; 30 MG/1; MG/1
1 TABLET, FILM COATED ORAL 4 TIMES DAILY PRN
Qty: 56 TABLET | Refills: 0 | Status: SHIPPED | OUTPATIENT
Start: 2023-07-25

## 2023-07-25 RX ORDER — NITROGLYCERIN 0.4 MG/1
TABLET SUBLINGUAL
Qty: 25 TABLET | Status: CANCELLED | OUTPATIENT
Start: 2023-07-25

## 2023-07-25 RX ORDER — METOLAZONE 2.5 MG/1
TABLET ORAL EVERY 24 HOURS
COMMUNITY

## 2023-07-25 RX ORDER — TRAZODONE HYDROCHLORIDE 100 MG/1
TABLET ORAL
COMMUNITY

## 2023-07-25 RX ORDER — TOBRAMYCIN 3 MG/ML
SOLUTION/ DROPS OPHTHALMIC
COMMUNITY
Start: 2023-05-21

## 2023-07-25 RX ORDER — FLUTICASONE PROPIONATE 50 MCG
2 SPRAY, SUSPENSION (ML) NASAL DAILY
Qty: 16 G | Refills: 0 | Status: SHIPPED | OUTPATIENT
Start: 2023-07-25

## 2023-07-25 NOTE — PROGRESS NOTES
ESTABLISHED PRIMARY CARE VISIT    23  Name: Mayra Mccrary   : 1955   Age: 76 y.o.   Sex: female        Assessment & Plan:     Problem List Items Addressed This Visit          Circulatory    Coronary artery disease involving native coronary artery of native heart without angina pectoris     Follows with cardio  Asymptomatic  Has not used nitroglycerin, but has , needs refill  Continue aspirin 81 mg daily, atorvastatin 40 mg daily, carvedilol 25 mg twice daily, hydralazine 50 mg twice daily, Imdur 120 mg nightly, Ranexa 500 mg twice daily, Vascepa           Relevant Medications    metOLazone (ZAROXOLYN) 2.5 MG tablet    nitroGLYCERIN (NITROSTAT) 0.4 MG SL tablet       Endocrine    Diabetic peripheral neuropathy (HCC)     Continue gabapentin 100 mg as needed  Order for seat lift pending           Relevant Medications    traZODone (DESYREL) 100 MG tablet    busPIRone (BUSPAR) 5 MG tablet       Musculoskeletal and Integument    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     Chronic bilateral feet  Order for seat lift pending           Relevant Medications    febuxostat (ULORIC) 80 MG TABS tablet    Primary osteoarthritis involving multiple joints     Chronic joint pain and difficulty ambulating  Order for seat lift pending           Relevant Medications    febuxostat (ULORIC) 80 MG TABS tablet       Other    Recurrent falls     Secondary to medical comorbidities, difficulty ambulating  Order for seat lift pending           Mild major depression (HCC)     Chronic, uncontrolled due to stressors  See anxiety           Relevant Medications    traZODone (DESYREL) 100 MG tablet    busPIRone (BUSPAR) 5 MG tablet     Other Visit Diagnoses       Viral URI with cough    -  Primary    Negative COVID and flu testing  No lower symptoms at this time, will hold off on azithromycin or steroid  Trial Coricidin and Flonase    Relevant Medications    Chlorpheniramine-DM (CORICIDIN COUGH/COLD) 4-30 MG TABS

## 2023-07-26 RX ORDER — FLUTICASONE PROPIONATE 50 MCG
SPRAY, SUSPENSION (ML) NASAL
Qty: 48 G | OUTPATIENT
Start: 2023-07-26

## 2023-08-17 ASSESSMENT — ENCOUNTER SYMPTOMS
SHORTNESS OF BREATH: 0
SORE THROAT: 1
COUGH: 1

## 2023-08-17 NOTE — ASSESSMENT & PLAN NOTE
Follows with cardio  Asymptomatic  Has not used nitroglycerin, but has , needs refill  Continue aspirin 81 mg daily, atorvastatin 40 mg daily, carvedilol 25 mg twice daily, hydralazine 50 mg twice daily, Imdur 120 mg nightly, Ranexa 500 mg twice daily, Vascepa

## 2023-08-31 DIAGNOSIS — F43.22 ADJUSTMENT REACTION WITH ANXIETY: ICD-10-CM

## 2023-08-31 DIAGNOSIS — J06.9 VIRAL URI WITH COUGH: ICD-10-CM

## 2023-08-31 RX ORDER — FLUTICASONE PROPIONATE 50 MCG
2 SPRAY, SUSPENSION (ML) NASAL DAILY
Qty: 3 EACH | Refills: 0 | Status: SHIPPED | OUTPATIENT
Start: 2023-08-31

## 2023-08-31 RX ORDER — BUSPIRONE HYDROCHLORIDE 5 MG/1
5 TABLET ORAL 3 TIMES DAILY PRN
Qty: 270 TABLET | Refills: 0 | Status: SHIPPED | OUTPATIENT
Start: 2023-08-31

## 2023-09-11 LAB
ALBUMIN SERPL-MCNC: NORMAL G/DL
ALP BLD-CCNC: NORMAL U/L
ALT SERPL-CCNC: NORMAL U/L
ANION GAP SERPL CALCULATED.3IONS-SCNC: NORMAL MMOL/L
AST SERPL-CCNC: NORMAL U/L
BASOPHILS ABSOLUTE: NORMAL
BASOPHILS RELATIVE PERCENT: NORMAL
BILIRUB SERPL-MCNC: NORMAL MG/DL
BUN BLDV-MCNC: NORMAL MG/DL
CALCIUM SERPL-MCNC: NORMAL MG/DL
CHLORIDE BLD-SCNC: NORMAL MMOL/L
CO2: NORMAL
CREAT SERPL-MCNC: NORMAL MG/DL
EGFR: NORMAL
EOSINOPHILS ABSOLUTE: NORMAL
EOSINOPHILS RELATIVE PERCENT: NORMAL
GLUCOSE BLD-MCNC: NORMAL MG/DL
HCT VFR BLD CALC: NORMAL %
HEMOGLOBIN: NORMAL
LYMPHOCYTES ABSOLUTE: NORMAL
LYMPHOCYTES RELATIVE PERCENT: NORMAL
MCH RBC QN AUTO: NORMAL PG
MCHC RBC AUTO-ENTMCNC: NORMAL G/DL
MCV RBC AUTO: NORMAL FL
MONOCYTES ABSOLUTE: NORMAL
MONOCYTES RELATIVE PERCENT: NORMAL
NEUTROPHILS ABSOLUTE: NORMAL
NEUTROPHILS RELATIVE PERCENT: NORMAL
PDW BLD-RTO: NORMAL %
PLATELET # BLD: NORMAL 10*3/UL
PMV BLD AUTO: NORMAL FL
POTASSIUM SERPL-SCNC: NORMAL MMOL/L
RBC # BLD: NORMAL 10*6/UL
SODIUM BLD-SCNC: NORMAL MMOL/L
TOTAL PROTEIN: NORMAL
VITAMIN B-12: 526
WBC # BLD: NORMAL 10*3/UL

## 2023-09-19 ENCOUNTER — TELEPHONE (OUTPATIENT)
Dept: PRIMARY CARE CLINIC | Age: 68
End: 2023-09-19

## 2023-10-03 ENCOUNTER — TELEPHONE (OUTPATIENT)
Dept: PRIMARY CARE CLINIC | Age: 68
End: 2023-10-03

## 2023-10-03 DIAGNOSIS — E03.9 ACQUIRED HYPOTHYROIDISM: ICD-10-CM

## 2023-10-03 DIAGNOSIS — E11.319 DIABETIC RETINOPATHY ASSOCIATED WITH TYPE 2 DIABETES MELLITUS, MACULAR EDEMA PRESENCE UNSPECIFIED, UNSPECIFIED LATERALITY, UNSPECIFIED RETINOPATHY SEVERITY (HCC): ICD-10-CM

## 2023-10-03 DIAGNOSIS — Z79.4 TYPE 2 DIABETES MELLITUS WITH STAGE 4 CHRONIC KIDNEY DISEASE, WITH LONG-TERM CURRENT USE OF INSULIN (HCC): Primary | ICD-10-CM

## 2023-10-03 DIAGNOSIS — E11.42 DIABETIC PERIPHERAL NEUROPATHY (HCC): ICD-10-CM

## 2023-10-03 DIAGNOSIS — N18.4 TYPE 2 DIABETES MELLITUS WITH STAGE 4 CHRONIC KIDNEY DISEASE, WITH LONG-TERM CURRENT USE OF INSULIN (HCC): Primary | ICD-10-CM

## 2023-10-03 DIAGNOSIS — E11.22 TYPE 2 DIABETES MELLITUS WITH STAGE 4 CHRONIC KIDNEY DISEASE, WITH LONG-TERM CURRENT USE OF INSULIN (HCC): Primary | ICD-10-CM

## 2023-10-03 NOTE — TELEPHONE ENCOUNTER
Pts caregiver called in- kiran is retiring and is not referring his patients to anyone and told her to reach out to her pcp for new referral. She lives in [address] [place] [address] and [facility] but knows there may not be anyone in [facility] and is ok with whomever

## 2023-10-17 DIAGNOSIS — G25.0 ESSENTIAL TREMOR: ICD-10-CM

## 2023-10-17 LAB

## 2023-10-17 NOTE — TELEPHONE ENCOUNTER
Patients last appointment 7/25/2023.   Patients next scheduled appointment   Future Appointments   Date Time Provider [address]   11/2/2023  4:30 PM Celedonio Epley, MD [facility]   4/30/2024  3:30 PM Celedonio Epley, MD [address] [facility]

## 2023-10-18 DIAGNOSIS — Z78.0 ASYMPTOMATIC MENOPAUSAL STATE: ICD-10-CM

## 2023-10-18 DIAGNOSIS — Z13.820 SCREENING FOR OSTEOPOROSIS: ICD-10-CM

## 2023-10-18 RX ORDER — GABAPENTIN 100 MG/1
100 CAPSULE ORAL DAILY
Qty: 90 CAPSULE | Refills: 0 | Status: SHIPPED | OUTPATIENT
Start: 2023-10-18 | End: 2024-01-16

## 2023-11-02 ENCOUNTER — OFFICE VISIT (OUTPATIENT)
Dept: PRIMARY CARE CLINIC | Age: 68
End: 2023-11-02

## 2023-11-02 VITALS
TEMPERATURE: 97.2 F | HEIGHT: 61 IN | SYSTOLIC BLOOD PRESSURE: 110 MMHG | HEART RATE: 71 BPM | OXYGEN SATURATION: 96 % | DIASTOLIC BLOOD PRESSURE: 72 MMHG | WEIGHT: 198.4 LBS | BODY MASS INDEX: 37.46 KG/M2

## 2023-11-02 DIAGNOSIS — F32.0 MILD MAJOR DEPRESSION (HCC): ICD-10-CM

## 2023-11-02 DIAGNOSIS — Z79.4 TYPE 2 DIABETES MELLITUS WITH STAGE 4 CHRONIC KIDNEY DISEASE, WITH LONG-TERM CURRENT USE OF INSULIN (HCC): Primary | ICD-10-CM

## 2023-11-02 DIAGNOSIS — B37.2 INTERTRIGINOUS CANDIDIASIS: ICD-10-CM

## 2023-11-02 DIAGNOSIS — Z23 NEED FOR IMMUNIZATION AGAINST INFLUENZA: ICD-10-CM

## 2023-11-02 DIAGNOSIS — N18.4 TYPE 2 DIABETES MELLITUS WITH STAGE 4 CHRONIC KIDNEY DISEASE, WITH LONG-TERM CURRENT USE OF INSULIN (HCC): Primary | ICD-10-CM

## 2023-11-02 DIAGNOSIS — E11.22 TYPE 2 DIABETES MELLITUS WITH STAGE 4 CHRONIC KIDNEY DISEASE, WITH LONG-TERM CURRENT USE OF INSULIN (HCC): Primary | ICD-10-CM

## 2023-11-02 DIAGNOSIS — F43.22 ADJUSTMENT REACTION WITH ANXIETY: ICD-10-CM

## 2023-11-02 RX ORDER — BUSPIRONE HYDROCHLORIDE 10 MG/1
10 TABLET ORAL 3 TIMES DAILY
Qty: 270 TABLET | Refills: 0 | Status: SHIPPED | OUTPATIENT
Start: 2023-11-02

## 2023-11-02 RX ORDER — NYSTATIN 100000 [USP'U]/G
POWDER TOPICAL
Qty: 60 G | Refills: 1 | Status: SHIPPED | OUTPATIENT
Start: 2023-11-02

## 2023-11-02 RX ORDER — POTASSIUM CHLORIDE 20 MEQ/1
20 TABLET, EXTENDED RELEASE ORAL DAILY
COMMUNITY
Start: 2023-10-02

## 2023-11-02 RX ORDER — PEN NEEDLE, DIABETIC 29 G X1/2"
NEEDLE, DISPOSABLE MISCELLANEOUS
Qty: 300 EACH | Refills: 3 | Status: SHIPPED | OUTPATIENT
Start: 2023-11-02

## 2023-11-02 RX ORDER — NYSTATIN 100000 U/G
CREAM TOPICAL
Qty: 30 G | Refills: 1 | Status: SHIPPED | OUTPATIENT
Start: 2023-11-02

## 2023-11-02 NOTE — PATIENT INSTRUCTIONS
[address] [facility] and Endocrinology   [facility].  [address]   [address] [place]: [phone]

## 2023-11-02 NOTE — PROGRESS NOTES
febuxostat (ULORIC) 80 MG TABS tablet, Take 1 tablet by mouth daily, Disp: , Rfl:     traZODone (DESYREL) 100 MG tablet, (Prior Auth: Rx Ref#:[id number]) Oral for 30, Disp: , Rfl:     nitroGLYCERIN (NITROSTAT) 0.4 MG SL tablet, Place 1 tablet under the tongue every 5 minutes as needed for Chest pain up to max of 3 total doses.  If no relief after 1 dose, call 911., Disp: 25 tablet, Rfl: 0    BD INSULIN SYRINGE U/F 31G X 5/16\" 0.5 ML MISC, USE THREE TIMES DAILY AS DIRECTED, Disp: , Rfl:     furosemide (LASIX) 40 MG tablet, Take 2 tablets by mouth 80 mg daily, plus 40 mg every other day, Disp: , Rfl:     acetaminophen (TYLENOL) 325 MG tablet, Every 6 hours as needed, Disp: , Rfl:     atorvastatin (LIPITOR) 40 MG tablet, atorvastatin 40 mg tablet  TAKE 1 TABLET BY MOUTH DAILY, Disp: , Rfl:     brimonidine (ALPHAGAN) 0.2 % ophthalmic solution, , Disp: , Rfl:     carvedilol (COREG) 25 MG tablet, carvedilol 25 mg tablet  TAKE 1 TABLET BY MOUTH TWICE DAILY, Disp: , Rfl:     hydrALAZINE (APRESOLINE) 50 MG tablet, hydralazine 50 mg tablet  Take 1 tablet twice a day by oral route., Disp: , Rfl:     Icosapent Ethyl (VASCEPA) 1 g CAPS capsule, , Disp: , Rfl:     insulin regular human (HUMULIN R) 500 UNIT/ML concentrated injection vial, Humulin R U-500 (Concentrated) Insulin 500 unit/mL subcutaneous soln, Disp: , Rfl:     isosorbide mononitrate (IMDUR) 60 MG extended release tablet, isosorbide mononitrate ER 60 mg tablet,extended release 24 hr  TAKE 2 TABLETS BY MOUTH EVERY NIGHT AT BEDTIME, Disp: , Rfl:     latanoprost (XALATAN) 0.005 % ophthalmic solution, , Disp: , Rfl:     levothyroxine (SYNTHROID) 88 MCG tablet, levothyroxine 88 mcg tablet  TAKE 1 TABLET BY MOUTH EVERY DAY, Disp: , Rfl:     aspirin 81 MG chewable tablet, 1 tablet, Disp: , Rfl:     vitamin D 25 MCG (1000 UT) CAPS, Take by mouth, Disp: , Rfl:     Continuous Blood Gluc Sensor (FREESTYLE JOELLE 2 SENSOR) MISC, CHANGE SENSOR EVERY 14 DAYS AS DIRECTED, Disp: ,

## 2023-11-04 LAB
ALBUMIN SERPL-MCNC: NORMAL G/DL
AVERAGE GLUCOSE: 140
BASOPHILS ABSOLUTE: NORMAL
BASOPHILS RELATIVE PERCENT: NORMAL
BUN / CREAT RATIO: NORMAL
BUN BLDV-MCNC: NORMAL MG/DL
CALCIUM SERPL-MCNC: NORMAL MG/DL
CHLORIDE BLD-SCNC: NORMAL MMOL/L
CO2: NORMAL
CREAT SERPL-MCNC: NORMAL MG/DL
EOSINOPHILS ABSOLUTE: NORMAL
EOSINOPHILS RELATIVE PERCENT: NORMAL
GFR SERPL CREATININE-BSD FRML MDRD: NORMAL ML/MIN/{1.73_M2}
GLUCOSE: NORMAL
HBA1C MFR BLD: 6.5 %
HCT VFR BLD CALC: NORMAL %
HEMOGLOBIN: NORMAL
LYMPHOCYTES ABSOLUTE: NORMAL
LYMPHOCYTES RELATIVE PERCENT: NORMAL
MCH RBC QN AUTO: NORMAL PG
MCHC RBC AUTO-ENTMCNC: NORMAL G/DL
MCV RBC AUTO: NORMAL FL
MONOCYTES ABSOLUTE: NORMAL
MONOCYTES RELATIVE PERCENT: NORMAL
NEUTROPHILS ABSOLUTE: NORMAL
NEUTROPHILS RELATIVE PERCENT: NORMAL
PDW BLD-RTO: NORMAL %
PHOSPHORUS: NORMAL
PLATELET # BLD: NORMAL 10*3/UL
PMV BLD AUTO: NORMAL FL
POTASSIUM SERPL-SCNC: NORMAL MMOL/L
PTH INTACT: NORMAL
RBC # BLD: NORMAL 10*6/UL
SODIUM BLD-SCNC: NORMAL MMOL/L
WBC # BLD: NORMAL 10*3/UL

## 2023-11-07 DIAGNOSIS — E11.22 TYPE 2 DIABETES MELLITUS WITH STAGE 4 CHRONIC KIDNEY DISEASE, WITH LONG-TERM CURRENT USE OF INSULIN (HCC): ICD-10-CM

## 2023-11-07 DIAGNOSIS — Z79.4 TYPE 2 DIABETES MELLITUS WITH STAGE 4 CHRONIC KIDNEY DISEASE, WITH LONG-TERM CURRENT USE OF INSULIN (HCC): ICD-10-CM

## 2023-11-07 DIAGNOSIS — N18.4 TYPE 2 DIABETES MELLITUS WITH STAGE 4 CHRONIC KIDNEY DISEASE, WITH LONG-TERM CURRENT USE OF INSULIN (HCC): ICD-10-CM

## 2023-11-16 PROBLEM — F43.22 ADJUSTMENT REACTION WITH ANXIETY: Status: ACTIVE | Noted: 2023-11-16

## 2023-11-16 ASSESSMENT — ENCOUNTER SYMPTOMS
SHORTNESS OF BREATH: 0
COUGH: 0

## 2023-11-16 NOTE — ASSESSMENT & PLAN NOTE
Chronic, controlled with last A1c 6.9  Recheck  Follows with endocrinology, retiring, new referral placed  Continue Humulin R per endo, OK to hold to avoid hypoglycemia, will need adjustments with new endo  Continue gabapentin 100 mg daily for neuropathy

## 2023-11-16 NOTE — ASSESSMENT & PLAN NOTE
Chronic, uncontrolled due to grief due to daughter's pancreatic cancer  Trial increased Buspar 10 mg 3 times daily as needed

## 2023-12-05 DIAGNOSIS — J06.9 VIRAL URI WITH COUGH: ICD-10-CM

## 2023-12-05 RX ORDER — FLUTICASONE PROPIONATE 50 MCG
2 SPRAY, SUSPENSION (ML) NASAL DAILY
Qty: 48 G | Refills: 1 | Status: SHIPPED | OUTPATIENT
Start: 2023-12-05

## 2023-12-05 NOTE — TELEPHONE ENCOUNTER
Patients last appointment 11/2/2023.   Patients next scheduled appointment   Future Appointments   Date Time Provider [address]   2/1/2024  4:00 PM Immanuel Garcia MD [facility]   4/17/2024  3:00 PM BERE Angel - CNS [facility] END[facility]   4/30/2024  3:30 PM Immanuel Garcia MD [address] [facility]

## 2023-12-20 ENCOUNTER — TELEPHONE (OUTPATIENT)
Dept: PRIMARY CARE CLINIC | Age: 68
End: 2023-12-20

## 2023-12-20 DIAGNOSIS — G47.33 OSA ON CPAP: Primary | ICD-10-CM

## 2023-12-20 NOTE — TELEPHONE ENCOUNTER
Patients daughter Bernadette Ny ([phone]) called and states patient is getting a new insurance and the [address] [place] [address] is not covered so they need a new order sent to [address] [place] [address] they are using .  Fx [phone]

## 2023-12-24 DIAGNOSIS — F43.22 ADJUSTMENT REACTION WITH ANXIETY: ICD-10-CM

## 2023-12-26 RX ORDER — BUSPIRONE HYDROCHLORIDE 5 MG/1
5 TABLET ORAL 3 TIMES DAILY PRN
Qty: 270 TABLET | Refills: 0 | OUTPATIENT
Start: 2023-12-26

## 2024-01-04 ENCOUNTER — TELEPHONE (OUTPATIENT)
Dept: PRIMARY CARE CLINIC | Age: 69
End: 2024-01-04

## 2024-01-04 NOTE — TELEPHONE ENCOUNTER
Seema an NP from [facility] calling to update pt med list she is currently in hospital with pneumonia and will be heading to a skilled nursing home for some rehab. She was started on Zoloft she was struggling with depression while in the hospital her daughter has cancer and is now in hospice and her grandson recently passed.

## 2024-01-23 DIAGNOSIS — G25.0 ESSENTIAL TREMOR: ICD-10-CM

## 2024-01-23 DIAGNOSIS — F43.22 ADJUSTMENT REACTION WITH ANXIETY: ICD-10-CM

## 2024-01-23 RX ORDER — BUSPIRONE HYDROCHLORIDE 10 MG/1
10 TABLET ORAL 3 TIMES DAILY
Qty: 270 TABLET | Refills: 0 | Status: SHIPPED | OUTPATIENT
Start: 2024-01-23

## 2024-01-23 RX ORDER — GABAPENTIN 100 MG/1
100 CAPSULE ORAL DAILY
Qty: 90 CAPSULE | Refills: 0 | Status: SHIPPED | OUTPATIENT
Start: 2024-01-23 | End: 2024-04-22

## 2024-01-23 NOTE — TELEPHONE ENCOUNTER
Patients last appointment 11/2/2023.  Patients next scheduled appointment   Future Appointments   Date Time Provider Department Center   4/17/2024  3:00 PM [facility]   4/30/2024  3:30 PM Alfonzo Ring MD [facility]

## 2024-02-08 ENCOUNTER — TELEPHONE (OUTPATIENT)
Dept: PRIMARY CARE CLINIC | Age: 69
End: 2024-02-08

## 2024-02-08 NOTE — TELEPHONE ENCOUNTER
Patients  called in off of a voicemail that was left from us (trying to R/S 4/30 AWV-provider off). Upon discussion, he mentioned that patient was currently in nursing home at [facility] in [place], [place]. He is not sure when she will be discharged, but they will call to r/s when she is out.

## 2024-02-16 ENCOUNTER — TELEPHONE (OUTPATIENT)
Dept: PRIMARY CARE CLINIC | Age: 69
End: 2024-02-16

## 2024-02-16 NOTE — TELEPHONE ENCOUNTER
Angelique the  at [facility] in [place] calling because patient will be discharge tomorrow and would like to set up a follow appointment but you have nothing till June.    Please advise where to schedule

## 2024-02-22 ENCOUNTER — TELEPHONE (OUTPATIENT)
Dept: PRIMARY CARE CLINIC | Age: 69
End: 2024-02-22

## 2024-02-22 ENCOUNTER — OFFICE VISIT (OUTPATIENT)
Dept: PRIMARY CARE CLINIC | Age: 69
End: 2024-02-22
Payer: COMMERCIAL

## 2024-02-22 VITALS
DIASTOLIC BLOOD PRESSURE: 76 MMHG | TEMPERATURE: 97.8 F | RESPIRATION RATE: 14 BRPM | BODY MASS INDEX: 37.61 KG/M2 | SYSTOLIC BLOOD PRESSURE: 126 MMHG | OXYGEN SATURATION: 96 % | WEIGHT: 199.2 LBS | HEIGHT: 61 IN | HEART RATE: 72 BPM

## 2024-02-22 DIAGNOSIS — G47.33 OSA TREATED WITH BIPAP: ICD-10-CM

## 2024-02-22 DIAGNOSIS — Z63.4 GRIEF AT LOSS OF CHILD: ICD-10-CM

## 2024-02-22 DIAGNOSIS — I50.33 ACUTE ON CHRONIC CONGESTIVE HEART FAILURE WITH LEFT VENTRICULAR DIASTOLIC DYSFUNCTION (HCC): ICD-10-CM

## 2024-02-22 DIAGNOSIS — J96.21 ACUTE ON CHRONIC RESPIRATORY FAILURE WITH HYPOXIA (HCC): ICD-10-CM

## 2024-02-22 DIAGNOSIS — E87.5 HYPERKALEMIA: ICD-10-CM

## 2024-02-22 DIAGNOSIS — Z79.4 TYPE 2 DIABETES MELLITUS WITH STAGE 4 CHRONIC KIDNEY DISEASE, WITH LONG-TERM CURRENT USE OF INSULIN (HCC): ICD-10-CM

## 2024-02-22 DIAGNOSIS — N18.4 TYPE 2 DIABETES MELLITUS WITH STAGE 4 CHRONIC KIDNEY DISEASE, WITH LONG-TERM CURRENT USE OF INSULIN (HCC): ICD-10-CM

## 2024-02-22 DIAGNOSIS — J18.9 COMMUNITY ACQUIRED PNEUMONIA, UNSPECIFIED LATERALITY: ICD-10-CM

## 2024-02-22 DIAGNOSIS — D50.9 CHRONIC IRON DEFICIENCY ANEMIA: ICD-10-CM

## 2024-02-22 DIAGNOSIS — N18.4 STAGE 4 CHRONIC KIDNEY DISEASE (HCC): ICD-10-CM

## 2024-02-22 DIAGNOSIS — F43.21 GRIEF AT LOSS OF CHILD: ICD-10-CM

## 2024-02-22 DIAGNOSIS — E11.22 TYPE 2 DIABETES MELLITUS WITH STAGE 4 CHRONIC KIDNEY DISEASE, WITH LONG-TERM CURRENT USE OF INSULIN (HCC): ICD-10-CM

## 2024-02-22 DIAGNOSIS — J44.1 CHRONIC OBSTRUCTIVE PULMONARY DISEASE WITH ACUTE EXACERBATION (HCC): ICD-10-CM

## 2024-02-22 DIAGNOSIS — F32.0 MILD MAJOR DEPRESSION (HCC): ICD-10-CM

## 2024-02-22 DIAGNOSIS — F43.22 ADJUSTMENT REACTION WITH ANXIETY: ICD-10-CM

## 2024-02-22 DIAGNOSIS — Z09 HOSPITAL DISCHARGE FOLLOW-UP: Primary | ICD-10-CM

## 2024-02-22 PROCEDURE — 1036F TOBACCO NON-USER: CPT | Performed by: STUDENT IN AN ORGANIZED HEALTH CARE EDUCATION/TRAINING PROGRAM

## 2024-02-22 PROCEDURE — G8400 PT W/DXA NO RESULTS DOC: HCPCS | Performed by: STUDENT IN AN ORGANIZED HEALTH CARE EDUCATION/TRAINING PROGRAM

## 2024-02-22 PROCEDURE — G8427 DOCREV CUR MEDS BY ELIG CLIN: HCPCS | Performed by: STUDENT IN AN ORGANIZED HEALTH CARE EDUCATION/TRAINING PROGRAM

## 2024-02-22 PROCEDURE — 3074F SYST BP LT 130 MM HG: CPT | Performed by: STUDENT IN AN ORGANIZED HEALTH CARE EDUCATION/TRAINING PROGRAM

## 2024-02-22 PROCEDURE — 3023F SPIROM DOC REV: CPT | Performed by: STUDENT IN AN ORGANIZED HEALTH CARE EDUCATION/TRAINING PROGRAM

## 2024-02-22 PROCEDURE — 3078F DIAST BP <80 MM HG: CPT | Performed by: STUDENT IN AN ORGANIZED HEALTH CARE EDUCATION/TRAINING PROGRAM

## 2024-02-22 PROCEDURE — 2022F DILAT RTA XM EVC RTNOPTHY: CPT | Performed by: STUDENT IN AN ORGANIZED HEALTH CARE EDUCATION/TRAINING PROGRAM

## 2024-02-22 PROCEDURE — 99214 OFFICE O/P EST MOD 30 MIN: CPT | Performed by: STUDENT IN AN ORGANIZED HEALTH CARE EDUCATION/TRAINING PROGRAM

## 2024-02-22 PROCEDURE — 1090F PRES/ABSN URINE INCON ASSESS: CPT | Performed by: STUDENT IN AN ORGANIZED HEALTH CARE EDUCATION/TRAINING PROGRAM

## 2024-02-22 PROCEDURE — 3046F HEMOGLOBIN A1C LEVEL >9.0%: CPT | Performed by: STUDENT IN AN ORGANIZED HEALTH CARE EDUCATION/TRAINING PROGRAM

## 2024-02-22 PROCEDURE — G8417 CALC BMI ABV UP PARAM F/U: HCPCS | Performed by: STUDENT IN AN ORGANIZED HEALTH CARE EDUCATION/TRAINING PROGRAM

## 2024-02-22 PROCEDURE — 1123F ACP DISCUSS/DSCN MKR DOCD: CPT | Performed by: STUDENT IN AN ORGANIZED HEALTH CARE EDUCATION/TRAINING PROGRAM

## 2024-02-22 PROCEDURE — G8484 FLU IMMUNIZE NO ADMIN: HCPCS | Performed by: STUDENT IN AN ORGANIZED HEALTH CARE EDUCATION/TRAINING PROGRAM

## 2024-02-22 PROCEDURE — 3017F COLORECTAL CA SCREEN DOC REV: CPT | Performed by: STUDENT IN AN ORGANIZED HEALTH CARE EDUCATION/TRAINING PROGRAM

## 2024-02-22 SDOH — SOCIAL STABILITY - SOCIAL INSECURITY: DISSAPEARANCE AND DEATH OF FAMILY MEMBER: Z63.4

## 2024-02-22 ASSESSMENT — PATIENT HEALTH QUESTIONNAIRE - PHQ9
10. IF YOU CHECKED OFF ANY PROBLEMS, HOW DIFFICULT HAVE THESE PROBLEMS MADE IT FOR YOU TO DO YOUR WORK, TAKE CARE OF THINGS AT HOME, OR GET ALONG WITH OTHER PEOPLE: 1
2. FEELING DOWN, DEPRESSED OR HOPELESS: 0
5. POOR APPETITE OR OVEREATING: 0
1. LITTLE INTEREST OR PLEASURE IN DOING THINGS: 0
4. FEELING TIRED OR HAVING LITTLE ENERGY: 3
8. MOVING OR SPEAKING SO SLOWLY THAT OTHER PEOPLE COULD HAVE NOTICED. OR THE OPPOSITE, BEING SO FIGETY OR RESTLESS THAT YOU HAVE BEEN MOVING AROUND A LOT MORE THAN USUAL: 1
SUM OF ALL RESPONSES TO PHQ QUESTIONS 1-9: 7
3. TROUBLE FALLING OR STAYING ASLEEP: 2
6. FEELING BAD ABOUT YOURSELF - OR THAT YOU ARE A FAILURE OR HAVE LET YOURSELF OR YOUR FAMILY DOWN: 0
SUM OF ALL RESPONSES TO PHQ9 QUESTIONS 1 & 2: 0
7. TROUBLE CONCENTRATING ON THINGS, SUCH AS READING THE NEWSPAPER OR WATCHING TELEVISION: 1
SUM OF ALL RESPONSES TO PHQ QUESTIONS 1-9: 7
9. THOUGHTS THAT YOU WOULD BE BETTER OFF DEAD, OR OF HURTING YOURSELF: 0

## 2024-02-22 NOTE — TELEPHONE ENCOUNTER
Angelique from [facility] called and states she was trying to get patient set up with a Bipap through [facility] but they are requiring more documentation. They need to know the reason/why patient is switching from Cpap to Bipap. You can not use dx of COPD because patient has PATTIE. Angelique states because she is medicare they will need all this documented in a face to face visit and she knows she has an appointment today so she was hoping you would be able to help them out so they can get this for the patient.     We can fax this note to [phone]

## 2024-02-24 LAB
ALBUMIN SERPL-MCNC: NORMAL G/DL
ALP BLD-CCNC: NORMAL U/L
ALT SERPL-CCNC: NORMAL U/L
ANION GAP SERPL CALCULATED.3IONS-SCNC: NORMAL MMOL/L
AST SERPL-CCNC: NORMAL U/L
BILIRUB SERPL-MCNC: NORMAL MG/DL
BUN BLDV-MCNC: NORMAL MG/DL
CALCIUM SERPL-MCNC: NORMAL MG/DL
CHLORIDE BLD-SCNC: NORMAL MMOL/L
CHOLESTEROL, TOTAL: NORMAL
CHOLESTEROL/HDL RATIO: NORMAL
CO2: NORMAL
CREAT SERPL-MCNC: NORMAL MG/DL
CREATININE, URINE: NORMAL
EGFR: NORMAL
GLUCOSE BLD-MCNC: NORMAL MG/DL
HDLC SERPL-MCNC: NORMAL MG/DL
LDL CHOLESTEROL CALCULATED: NORMAL
MICROALBUMIN/CREAT 24H UR: NORMAL MG/G{CREAT}
MICROALBUMIN/CREAT UR-RTO: NORMAL
NONHDLC SERPL-MCNC: NORMAL MG/DL
POTASSIUM SERPL-SCNC: NORMAL MMOL/L
SODIUM BLD-SCNC: NORMAL MMOL/L
TOTAL PROTEIN: NORMAL
TRIGL SERPL-MCNC: NORMAL MG/DL
TSH SERPL DL<=0.05 MIU/L-ACNC: NORMAL M[IU]/L
VITAMIN D 25-HYDROXY: NORMAL
VITAMIN D2, 25 HYDROXY: NORMAL
VITAMIN D3,25 HYDROXY: NORMAL
VLDLC SERPL CALC-MCNC: NORMAL MG/DL

## 2024-02-27 NOTE — PROGRESS NOTES
[facility] records obtained, placed in clinicals box to scan for patients chart/provider review.  
Requested.  
[facility] records obtained, placed in clinicals box to scan for provider review.  
Affect: Mood is depressed. Affect is tearful.         Speech: Speech normal.         Behavior: Behavior normal. Behavior is cooperative.         Thought Content: Thought content normal.         An electronic signature was used to authenticate this note.  --Alfonzo Ring MD

## 2024-02-28 ENCOUNTER — TELEPHONE (OUTPATIENT)
Dept: PRIMARY CARE CLINIC | Age: 69
End: 2024-02-28

## 2024-02-28 NOTE — TELEPHONE ENCOUNTER
Nurse from [facility] called to see if home health was being ordered for this patient. The  and patient was under the impression there would be but has not heard anything They are already getting private aid services already through them. [facility] fax [phone]. We will need to notify the patient if we are ordering this or not

## 2024-02-29 NOTE — TELEPHONE ENCOUNTER
Angelique called asking about this request.   She states she needs a note that states a cpap was tried and failed and dx for copd can not be used.

## 2024-03-05 LAB
BASOPHILS ABSOLUTE: NORMAL
BASOPHILS RELATIVE PERCENT: NORMAL
EOSINOPHILS ABSOLUTE: NORMAL
EOSINOPHILS RELATIVE PERCENT: NORMAL
HCT VFR BLD CALC: NORMAL %
HEMOGLOBIN: NORMAL
LYMPHOCYTES ABSOLUTE: NORMAL
LYMPHOCYTES RELATIVE PERCENT: NORMAL
MCH RBC QN AUTO: NORMAL PG
MCHC RBC AUTO-ENTMCNC: NORMAL G/DL
MCV RBC AUTO: NORMAL FL
MONOCYTES ABSOLUTE: NORMAL
MONOCYTES RELATIVE PERCENT: NORMAL
NEUTROPHILS ABSOLUTE: NORMAL
NEUTROPHILS RELATIVE PERCENT: NORMAL
PLATELET # BLD: NORMAL 10*3/UL
PMV BLD AUTO: NORMAL FL
RBC # BLD: NORMAL 10*6/UL
WBC # BLD: NORMAL 10*3/UL

## 2024-03-05 RX ORDER — BUMETANIDE 2 MG/1
2 TABLET ORAL DAILY
COMMUNITY

## 2024-03-06 NOTE — TELEPHONE ENCOUNTER
Called patient, spoke with , see comm form. This was somehow not set up when she was discharged from rehab to home. Added order to hospital follow-up. Please fax: [facility] fax [phone].  notified this would be ordered.

## 2024-03-08 NOTE — TELEPHONE ENCOUNTER
Still trying to get a hold of patients  to see if she takes bumexor lasix and to discuss home health as [facility] is not in network for her.  I left a vm for him to call me back.

## 2024-03-13 ENCOUNTER — TELEPHONE (OUTPATIENT)
Dept: PRIMARY CARE CLINIC | Age: 69
End: 2024-03-13

## 2024-03-13 DIAGNOSIS — I50.32 CHRONIC CONGESTIVE HEART FAILURE WITH LEFT VENTRICULAR DIASTOLIC DYSFUNCTION (HCC): Primary | ICD-10-CM

## 2024-03-13 NOTE — TELEPHONE ENCOUNTER
Tried to reach out to  again asking if patient takes bumex or lasix no answer left vm to call back     Also needs to check their insurance to see who is in network for home health

## 2024-03-19 NOTE — TELEPHONE ENCOUNTER
Spoke to  and he states she still takes lasix.     I have a call out to cardiologist to see which one she is supposed to be on, waiting on call back.

## 2024-03-22 LAB — DIABETIC RETINOPATHY: POSITIVE

## 2024-03-26 NOTE — TELEPHONE ENCOUNTER
Left vm with cardiologist office again to verify medication lasix or bumex that the patient is to be taking

## 2024-03-27 ENCOUNTER — TELEPHONE (OUTPATIENT)
Dept: PRIMARY CARE CLINIC | Age: 69
End: 2024-03-27

## 2024-03-27 NOTE — TELEPHONE ENCOUNTER
Nicky called and she gave me some in network options for home health for patient.     [facility]  [place] [facility] in [place], ph. [phone]  [facility], ph.[phone]

## 2024-03-27 NOTE — TELEPHONE ENCOUNTER
Dr Jhonathan Rm from [facility] called and she just saw patient today and was going through her meds. She is supposed to be on bumex 2 mg once daily and she hasn't been she has been taking the lasix 40 mg.     She is requesting you send medication to patients pharmacy for her.  Dr brown I also have her last cardiologist note from January 2024 and he has her on bumex as well.

## 2024-03-27 NOTE — TELEPHONE ENCOUNTER
Called Dr Baeza office and the lady I spoke with said she will fax patients last note with her med list on it. The dr is not in that office again till next week.

## 2024-04-02 PROBLEM — H40.1132 PRIMARY OPEN ANGLE GLAUCOMA (POAG) OF BOTH EYES, MODERATE STAGE: Status: ACTIVE | Noted: 2023-01-24

## 2024-04-02 PROBLEM — H40.1134 PRIMARY OPEN ANGLE GLAUCOMA (POAG) OF BOTH EYES, INDETERMINATE STAGE: Status: ACTIVE | Noted: 2024-04-02

## 2024-04-02 PROBLEM — E11.3493 SEVERE NONPROLIFERATIVE DIABETIC RETINOPATHY OF BOTH EYES WITHOUT MACULAR EDEMA ASSOCIATED WITH TYPE 2 DIABETES MELLITUS (HCC): Status: ACTIVE | Noted: 2024-04-02

## 2024-04-02 PROBLEM — H40.1132 PRIMARY OPEN ANGLE GLAUCOMA (POAG) OF BOTH EYES, MODERATE STAGE: Status: ACTIVE | Noted: 2024-04-02

## 2024-04-02 RX ORDER — BUMETANIDE 2 MG/1
2 TABLET ORAL DAILY
Qty: 90 TABLET | Refills: 1 | Status: SHIPPED | OUTPATIENT
Start: 2024-04-02

## 2024-04-03 ENCOUNTER — TELEPHONE (OUTPATIENT)
Dept: PRIMARY CARE CLINIC | Age: 69
End: 2024-04-03

## 2024-04-03 ENCOUNTER — OFFICE VISIT (OUTPATIENT)
Dept: PRIMARY CARE CLINIC | Age: 69
End: 2024-04-03
Payer: COMMERCIAL

## 2024-04-03 VITALS
HEIGHT: 61 IN | BODY MASS INDEX: 37.69 KG/M2 | RESPIRATION RATE: 18 BRPM | DIASTOLIC BLOOD PRESSURE: 60 MMHG | OXYGEN SATURATION: 97 % | SYSTOLIC BLOOD PRESSURE: 148 MMHG | WEIGHT: 199.6 LBS | HEART RATE: 77 BPM | TEMPERATURE: 98.7 F

## 2024-04-03 DIAGNOSIS — R09.02 HYPOXIA: Primary | ICD-10-CM

## 2024-04-03 DIAGNOSIS — J02.9 SORE THROAT: ICD-10-CM

## 2024-04-03 DIAGNOSIS — F51.04 PSYCHOPHYSIOLOGICAL INSOMNIA: Primary | ICD-10-CM

## 2024-04-03 DIAGNOSIS — F43.22 ADJUSTMENT REACTION WITH ANXIETY: ICD-10-CM

## 2024-04-03 DIAGNOSIS — R05.3 CHRONIC COUGH: ICD-10-CM

## 2024-04-03 LAB
INFLUENZA A ANTIGEN, POC: NEGATIVE
INFLUENZA B ANTIGEN, POC: NEGATIVE
LOT EXPIRE DATE: NORMAL
LOT KIT NUMBER: NORMAL
S PYO AG THROAT QL: NORMAL
SARS-COV-2, POC: NORMAL
VALID INTERNAL CONTROL: NORMAL
VENDOR AND KIT NAME POC: NORMAL

## 2024-04-03 PROCEDURE — 3078F DIAST BP <80 MM HG: CPT

## 2024-04-03 PROCEDURE — 87880 STREP A ASSAY W/OPTIC: CPT

## 2024-04-03 PROCEDURE — 1123F ACP DISCUSS/DSCN MKR DOCD: CPT

## 2024-04-03 PROCEDURE — 3077F SYST BP >= 140 MM HG: CPT

## 2024-04-03 PROCEDURE — 87428 SARSCOV & INF VIR A&B AG IA: CPT

## 2024-04-03 PROCEDURE — 99214 OFFICE O/P EST MOD 30 MIN: CPT

## 2024-04-03 RX ORDER — DULOXETIN HYDROCHLORIDE 60 MG/1
60 CAPSULE, DELAYED RELEASE ORAL DAILY
COMMUNITY
End: 2024-04-03 | Stop reason: SDUPTHER

## 2024-04-03 RX ORDER — FLURBIPROFEN SODIUM 0.3 MG/ML
SOLUTION/ DROPS OPHTHALMIC
COMMUNITY
Start: 2024-02-16

## 2024-04-03 RX ORDER — BUSPIRONE HYDROCHLORIDE 7.5 MG/1
TABLET ORAL
COMMUNITY
Start: 2024-02-16

## 2024-04-03 RX ORDER — PHENOL 1.4 %
AEROSOL, SPRAY (ML) MUCOUS MEMBRANE
COMMUNITY

## 2024-04-03 RX ORDER — CARVEDILOL 12.5 MG/1
TABLET ORAL
COMMUNITY
Start: 2024-02-16

## 2024-04-03 RX ORDER — AMLODIPINE BESYLATE 5 MG/1
TABLET ORAL
COMMUNITY
Start: 2024-02-16

## 2024-04-03 RX ORDER — INSULIN GLARGINE-YFGN 100 [IU]/ML
INJECTION, SOLUTION SUBCUTANEOUS
COMMUNITY
Start: 2024-03-18

## 2024-04-03 RX ORDER — ACETAMINOPHEN 500 MG
500 TABLET ORAL EVERY 6 HOURS PRN
COMMUNITY

## 2024-04-03 RX ORDER — INSULIN LISPRO 100 [IU]/ML
INJECTION, SOLUTION INTRAVENOUS; SUBCUTANEOUS
COMMUNITY
Start: 2024-03-23

## 2024-04-03 RX ORDER — TRAZODONE HYDROCHLORIDE 50 MG/1
TABLET ORAL
COMMUNITY
Start: 2024-02-16

## 2024-04-03 RX ORDER — FEBUXOSTAT 40 MG/1
TABLET, FILM COATED ORAL
COMMUNITY
Start: 2024-02-16

## 2024-04-03 RX ORDER — OMEPRAZOLE 20 MG/1
CAPSULE, DELAYED RELEASE ORAL
COMMUNITY
Start: 2024-02-16

## 2024-04-03 NOTE — TELEPHONE ENCOUNTER
Angelique the  for patient called and is still trying to get Bipap for patient as she is still using theirs and its costing them the facility $300. She is trying to get this submitted through [facility] but they were wondering if you can order an overnight Pulse ox and outpatient ABG's.

## 2024-04-03 NOTE — TELEPHONE ENCOUNTER
Patients last appointment 2/22/2024.  Patients next scheduled appointment   Future Appointments   Date Time Provider Department Center   6/25/2024  3:00 PM Alfonzo Ring MD [facility]

## 2024-04-03 NOTE — PROGRESS NOTES
Chief Complaint       Pharyngitis      History of Present Illness   Source of history provided by:  patient and spouse.     Aria Sim is a 68 y.o. old female presenting to the walk in clinic with her  for evaluation of sore throat and left ear pressure x 1 day. Denies any discharge from the ear canal. Adds chronic productive cough, dizziness, and nasal congestion. Patient denies cough being increased above baseline. Patient's  reports that cough is worse than baseline. Patient states that she experienced an episode of pre-syncope this morning. Denies any current dizziness or syncope. Patient's  states that patient has been a lot weaker and more SOB over the past few days. Reports that she has a hx of SOB due to CHF and COPD. Denies any fever, chills, generalized body aches, HA, paresthesias, unilateral weakness, hearing loss, tinnitus, loss of taste/smell, dysphagia, CP, palpitations, abdominal pain, nausea, vomiting, rash, or lethargy. Denies any known strep or influenza exposures. Denies any contact with any individuals with known COVID-19 infection or under investigation for COVID-19 infection. Admits to putting sweet oil in her left ear this morning for her symptoms. Patient reports history of pneumonia infections that required extended hospitalizations. Patient states that she wears oxygen at home, but only at night for PATTIE. Denies using oxygen during the day. Patient reports checking her SpO2 levels at home and states that it \"goes up and down.\" She cannot elaborate further regarding her lowest SpO2 reading at home.     ROS    Unless otherwise stated in this report or unable to obtain because of the patient's clinical or mental status as evidenced by the medical record, this patients's positive and negative responses for Review of Systems, constitutional, psych, eyes, ENT, cardiovascular, respiratory, gastrointestinal, neurological, genitourinary, musculoskeletal, integument

## 2024-04-04 NOTE — TELEPHONE ENCOUNTER
brought wife through [facility] and informed him she is to be on the bumex instead of lasix.  verbalized understanding.

## 2024-04-10 ENCOUNTER — TELEPHONE (OUTPATIENT)
Dept: PRIMARY CARE CLINIC | Age: 69
End: 2024-04-10

## 2024-04-10 RX ORDER — TRAZODONE HYDROCHLORIDE 50 MG/1
TABLET ORAL
Qty: 90 TABLET | Refills: 0 | Status: SHIPPED | OUTPATIENT
Start: 2024-04-10

## 2024-04-10 RX ORDER — BUSPIRONE HYDROCHLORIDE 10 MG/1
10 TABLET ORAL 3 TIMES DAILY
Qty: 270 TABLET | Refills: 0 | Status: SHIPPED | OUTPATIENT
Start: 2024-04-10

## 2024-04-10 RX ORDER — DULOXETIN HYDROCHLORIDE 60 MG/1
60 CAPSULE, DELAYED RELEASE ORAL DAILY
Qty: 90 CAPSULE | Refills: 0 | Status: SHIPPED | OUTPATIENT
Start: 2024-04-10

## 2024-04-10 NOTE — TELEPHONE ENCOUNTER
Cari patients care manager called and wanted to let you know she has tried to get a hold of the patient multiple times with no success but anyway she filled out her yearly health assessment and she stated on that she hasn't fallen but is afraid she will and she has noticed some cognitive changes, hard time making decisions for herself.     Cari does not need a call back but wanted to make you aware of this and to let you know you can always look at patients updated care plan on her [facility] patient portal.

## 2024-04-11 ENCOUNTER — TELEPHONE (OUTPATIENT)
Dept: PRIMARY CARE CLINIC | Age: 69
End: 2024-04-11

## 2024-04-11 NOTE — TELEPHONE ENCOUNTER
Patients and called and states that wife was in the hospital amd will need a follow up appointment.     Tried to call rachel back but no answer left vm to call back

## 2024-04-12 NOTE — TELEPHONE ENCOUNTER
Patient was hospitalized, likely why they couldn't reach her. Will discuss at hospital follow-up if time.

## 2024-04-13 LAB
BUN BLDV-MCNC: NORMAL MG/DL
CALCIUM SERPL-MCNC: NORMAL MG/DL
CHLORIDE BLD-SCNC: NORMAL MMOL/L
CO2: NORMAL
CREAT SERPL-MCNC: NORMAL MG/DL
EGFR: NORMAL
GLUCOSE BLD-MCNC: NORMAL MG/DL
POTASSIUM SERPL-SCNC: NORMAL MMOL/L
SODIUM BLD-SCNC: NORMAL MMOL/L

## 2024-04-15 ENCOUNTER — TELEPHONE (OUTPATIENT)
Dept: PRIMARY CARE CLINIC | Age: 69
End: 2024-04-15

## 2024-04-15 DIAGNOSIS — B37.2 INTERTRIGINOUS CANDIDIASIS: ICD-10-CM

## 2024-04-15 RX ORDER — NYSTATIN 100000 U/G
CREAM TOPICAL
Qty: 30 G | Refills: 10 | Status: SHIPPED | OUTPATIENT
Start: 2024-04-15

## 2024-04-15 RX ORDER — NYSTATIN 100000 [USP'U]/G
POWDER TOPICAL
Qty: 60 G | Refills: 10 | Status: SHIPPED | OUTPATIENT
Start: 2024-04-15

## 2024-04-15 NOTE — TELEPHONE ENCOUNTER
Patients last appointment 2/22/2024.  Patients next scheduled appointment   Future Appointments   Date Time Provider Department Center   4/16/2024  1:30 PM Alfonzo Ring MD [facility]   6/25/2024  3:00 PM Alfonzo Ring MD [facility]

## 2024-04-15 NOTE — TELEPHONE ENCOUNTER
Patients  called in and said patient was recently admitted to [facility], he said she was discharged on April 8th. Medical Records have been requested. He said that he left a message last week as well. Please advise where patient can be scheduled.     *Please disregard above message Dr. Ring, I had an appt open up at 1:30 on Tues 4/16, and also seen in clinical note you offered 4/16 at 3pm. I will call patient and her  and get them in.

## 2024-04-15 NOTE — TELEPHONE ENCOUNTER
Erick called back in, confirmed patient appt date/time for tomorrow, he verbalized understanding and confirmed.

## 2024-04-16 ENCOUNTER — OFFICE VISIT (OUTPATIENT)
Dept: PRIMARY CARE CLINIC | Age: 69
End: 2024-04-16
Payer: MEDICARE

## 2024-04-16 VITALS
OXYGEN SATURATION: 95 % | RESPIRATION RATE: 18 BRPM | TEMPERATURE: 97.1 F | BODY MASS INDEX: 37.38 KG/M2 | WEIGHT: 198 LBS | HEIGHT: 61 IN | DIASTOLIC BLOOD PRESSURE: 78 MMHG | SYSTOLIC BLOOD PRESSURE: 124 MMHG | HEART RATE: 62 BPM

## 2024-04-16 DIAGNOSIS — J96.11 CHRONIC RESPIRATORY FAILURE WITH HYPOXIA (HCC): ICD-10-CM

## 2024-04-16 DIAGNOSIS — I50.32 CHRONIC CONGESTIVE HEART FAILURE WITH LEFT VENTRICULAR DIASTOLIC DYSFUNCTION (HCC): ICD-10-CM

## 2024-04-16 DIAGNOSIS — Z09 HOSPITAL DISCHARGE FOLLOW-UP: Primary | ICD-10-CM

## 2024-04-16 DIAGNOSIS — J96.21 ACUTE ON CHRONIC RESPIRATORY FAILURE WITH HYPOXIA (HCC): ICD-10-CM

## 2024-04-16 LAB

## 2024-04-16 PROCEDURE — 1111F DSCHRG MED/CURRENT MED MERGE: CPT | Performed by: STUDENT IN AN ORGANIZED HEALTH CARE EDUCATION/TRAINING PROGRAM

## 2024-04-16 PROCEDURE — G8400 PT W/DXA NO RESULTS DOC: HCPCS | Performed by: STUDENT IN AN ORGANIZED HEALTH CARE EDUCATION/TRAINING PROGRAM

## 2024-04-16 PROCEDURE — 1090F PRES/ABSN URINE INCON ASSESS: CPT | Performed by: STUDENT IN AN ORGANIZED HEALTH CARE EDUCATION/TRAINING PROGRAM

## 2024-04-16 PROCEDURE — 1036F TOBACCO NON-USER: CPT | Performed by: STUDENT IN AN ORGANIZED HEALTH CARE EDUCATION/TRAINING PROGRAM

## 2024-04-16 PROCEDURE — G8417 CALC BMI ABV UP PARAM F/U: HCPCS | Performed by: STUDENT IN AN ORGANIZED HEALTH CARE EDUCATION/TRAINING PROGRAM

## 2024-04-16 PROCEDURE — 3017F COLORECTAL CA SCREEN DOC REV: CPT | Performed by: STUDENT IN AN ORGANIZED HEALTH CARE EDUCATION/TRAINING PROGRAM

## 2024-04-16 PROCEDURE — 3074F SYST BP LT 130 MM HG: CPT | Performed by: STUDENT IN AN ORGANIZED HEALTH CARE EDUCATION/TRAINING PROGRAM

## 2024-04-16 PROCEDURE — G2211 COMPLEX E/M VISIT ADD ON: HCPCS | Performed by: STUDENT IN AN ORGANIZED HEALTH CARE EDUCATION/TRAINING PROGRAM

## 2024-04-16 PROCEDURE — 99214 OFFICE O/P EST MOD 30 MIN: CPT | Performed by: STUDENT IN AN ORGANIZED HEALTH CARE EDUCATION/TRAINING PROGRAM

## 2024-04-16 PROCEDURE — G8427 DOCREV CUR MEDS BY ELIG CLIN: HCPCS | Performed by: STUDENT IN AN ORGANIZED HEALTH CARE EDUCATION/TRAINING PROGRAM

## 2024-04-16 PROCEDURE — 1123F ACP DISCUSS/DSCN MKR DOCD: CPT | Performed by: STUDENT IN AN ORGANIZED HEALTH CARE EDUCATION/TRAINING PROGRAM

## 2024-04-16 PROCEDURE — 3078F DIAST BP <80 MM HG: CPT | Performed by: STUDENT IN AN ORGANIZED HEALTH CARE EDUCATION/TRAINING PROGRAM

## 2024-04-16 RX ORDER — ACYCLOVIR 400 MG/1
TABLET ORAL
COMMUNITY

## 2024-04-16 RX ORDER — BUMETANIDE 2 MG/1
2 TABLET ORAL 2 TIMES DAILY
COMMUNITY

## 2024-04-16 RX ORDER — AMLODIPINE BESYLATE 10 MG/1
10 TABLET ORAL DAILY
COMMUNITY

## 2024-04-16 RX ORDER — OMEPRAZOLE 20 MG/1
20 CAPSULE, DELAYED RELEASE ORAL DAILY
COMMUNITY

## 2024-04-16 RX ORDER — IPRATROPIUM BROMIDE AND ALBUTEROL SULFATE 2.5; .5 MG/3ML; MG/3ML
SOLUTION RESPIRATORY (INHALATION)
COMMUNITY
Start: 2024-04-09

## 2024-04-16 NOTE — PROGRESS NOTES
solution  Commonly known as: XALATAN     levothyroxine 88 MCG tablet  Commonly known as: SYNTHROID     Melatonin 10 MG Tabs     metOLazone 2.5 MG tablet  Commonly known as: ZAROXOLYN     nitroGLYCERIN 0.4 MG SL tablet  Commonly known as: Nitrostat  Place 1 tablet under the tongue every 5 minutes as needed for Chest pain up to max of 3 total doses. If no relief after 1 dose, call 911.     * nystatin 416804 UNIT/GM powder  Commonly known as: MYCOSTATIN  APPLY TOPICALLY THREE TIMES A DAY     * nystatin 653451 UNIT/GM cream  Commonly known as: MYCOSTATIN  APPLY TO AFFECTED AREA TOPICALLY TWICE DAILY     omeprazole 40 MG delayed release capsule  Commonly known as: PRILOSEC     OXYGEN     ranolazine 500 MG extended release tablet  Commonly known as: RANEXA     Semglee (yfgn) 100 UNIT/ML Sopn  Generic drug: Insulin Glargine-yfgn     tobramycin 0.3 % ophthalmic solution  Commonly known as: TOBREX     traZODone 50 MG tablet  Commonly known as: DESYREL  Take 1 tablet nightly for pain     vitamin D 25 MCG (1000 UT) Caps           * This list has 2 medication(s) that are the same as other medications prescribed for you. Read the directions carefully, and ask your doctor or other care provider to review them with you.                Medications marked \"taking\" at this time  Outpatient Medications Marked as Taking for the 4/16/24 encounter (Office Visit) with Alfonzo Ring MD   Medication Sig Dispense Refill    ipratropium 0.5 mg-albuterol 2.5 mg (DUONEB) 0.5-2.5 (3) MG/3ML SOLN nebulizer solution USE 1 VIAL VIA NEBULIZER EVERY 4 HOURS AS NEEDED FOR DIFFICULTY BREATHING OR SHORTNESS OF BREATH      Continuous Blood Gluc  (DEXCOM G7 ) ELIZABETH by Does not apply route      Continuous Blood Gluc Sensor (DEXCOM G7 SENSOR) MISC by Does not apply route      amLODIPine (NORVASC) 10 MG tablet Take 1 tablet by mouth daily      bumetanide (BUMEX) 2 MG tablet Take 1 tablet by mouth 2 times daily      omeprazole (PRILOSEC) 20

## 2024-04-23 PROBLEM — J96.11 CHRONIC RESPIRATORY FAILURE WITH HYPOXIA (HCC): Status: ACTIVE | Noted: 2024-04-23

## 2024-04-25 LAB
CHP ED QC CHECK: NORMAL
GLUCOSE BLD-MCNC: NORMAL MG/DL

## 2024-04-30 ENCOUNTER — TELEPHONE (OUTPATIENT)
Dept: PRIMARY CARE CLINIC | Age: 69
End: 2024-04-30

## 2024-04-30 DIAGNOSIS — Z12.31 BREAST CANCER SCREENING BY MAMMOGRAM: Primary | ICD-10-CM

## 2024-05-25 LAB
ALBUMIN SERPL-MCNC: NORMAL G/DL
ALP BLD-CCNC: NORMAL U/L
ALT SERPL-CCNC: NORMAL U/L
ANION GAP SERPL CALCULATED.3IONS-SCNC: NORMAL MMOL/L
AST SERPL-CCNC: NORMAL U/L
BILIRUB SERPL-MCNC: NORMAL MG/DL
BUN BLDV-MCNC: NORMAL MG/DL
CALCIUM SERPL-MCNC: NORMAL MG/DL
CHLORIDE BLD-SCNC: NORMAL MMOL/L
CO2: NORMAL
CREAT SERPL-MCNC: NORMAL MG/DL
EGFR: NORMAL
GLUCOSE BLD-MCNC: NORMAL MG/DL
POTASSIUM SERPL-SCNC: NORMAL MMOL/L
SODIUM BLD-SCNC: NORMAL MMOL/L
TOTAL PROTEIN: NORMAL
TSH SERPL DL<=0.05 MIU/L-ACNC: NORMAL M[IU]/L

## 2024-06-14 ENCOUNTER — TELEPHONE (OUTPATIENT)
Dept: PRIMARY CARE CLINIC | Age: 69
End: 2024-06-14

## 2024-06-14 LAB
LEFT VENTRICULAR EJECTION FRACTION MODE: NORMAL
LV EF: 55 %

## 2024-06-14 NOTE — TELEPHONE ENCOUNTER
Gertrude estrella [facility] called and states patient is being discharged from [facility] today and they are wondering if dr will follow for home care nursing and PT.    Called gertrude and informed that yes she would follow.   Patient has an appointment with you on 6/25/24

## 2024-06-17 DIAGNOSIS — F43.22 ADJUSTMENT REACTION WITH ANXIETY: ICD-10-CM

## 2024-06-17 RX ORDER — OMEPRAZOLE 20 MG/1
CAPSULE, DELAYED RELEASE ORAL
Qty: 30 CAPSULE | Refills: 2 | Status: CANCELLED | OUTPATIENT
Start: 2024-06-17

## 2024-06-17 RX ORDER — ISOSORBIDE MONONITRATE 60 MG/1
60 TABLET, EXTENDED RELEASE ORAL DAILY
Qty: 90 TABLET | Refills: 0 | Status: SHIPPED
Start: 2024-06-17 | End: 2024-06-25 | Stop reason: SDUPTHER

## 2024-06-17 RX ORDER — OMEPRAZOLE 20 MG/1
20 CAPSULE, DELAYED RELEASE ORAL DAILY
Qty: 90 CAPSULE | Refills: 0 | Status: SHIPPED
Start: 2024-06-17 | End: 2024-06-25 | Stop reason: DRUGHIGH

## 2024-06-17 RX ORDER — BUSPIRONE HYDROCHLORIDE 10 MG/1
10 TABLET ORAL 3 TIMES DAILY
Qty: 270 TABLET | Refills: 0 | Status: SHIPPED
Start: 2024-06-17 | End: 2024-06-25 | Stop reason: SDUPTHER

## 2024-06-17 NOTE — TELEPHONE ENCOUNTER
Patients last appointment 4/16/2024.  Patients next scheduled appointment   Future Appointments   Date Time Provider Department Center   6/25/2024  3:00 PM [facility]      Jaron states that in the hospital she was switched to buspar 7.5mg and jaron wants to know if it is ok for her to stay on 10 mg if not that they need a new script for the 7.5 mg

## 2024-06-19 ENCOUNTER — TELEPHONE (OUTPATIENT)
Dept: PRIMARY CARE CLINIC | Age: 69
End: 2024-06-19

## 2024-06-19 NOTE — TELEPHONE ENCOUNTER
Sujey from [facility] called and states they were told to call you whenever patient had a 2 lb weight gain. She is now 190lb so did have a 2 lb weight gain

## 2024-06-19 NOTE — TELEPHONE ENCOUNTER
Please call patient's cardiologist with update. I do not adjust her diuretics given her complex medical comorbidities.

## 2024-06-20 NOTE — TELEPHONE ENCOUNTER
Jadyn called back and states they have spoken with jennifer at home nursing and patients  regarding this and have made the appropriate adjustments

## 2024-06-20 NOTE — TELEPHONE ENCOUNTER
Left detailed message with Dr Dias nurse martha and asked her to call me back to let me know if she got this message

## 2024-06-25 ENCOUNTER — OFFICE VISIT (OUTPATIENT)
Dept: PRIMARY CARE CLINIC | Age: 69
End: 2024-06-25
Payer: MEDICARE

## 2024-06-25 VITALS
BODY MASS INDEX: 35.12 KG/M2 | DIASTOLIC BLOOD PRESSURE: 66 MMHG | HEIGHT: 61 IN | OXYGEN SATURATION: 93 % | HEART RATE: 66 BPM | WEIGHT: 186 LBS | SYSTOLIC BLOOD PRESSURE: 132 MMHG | TEMPERATURE: 98.1 F

## 2024-06-25 DIAGNOSIS — J96.11 CHRONIC RESPIRATORY FAILURE WITH HYPOXIA (HCC): ICD-10-CM

## 2024-06-25 DIAGNOSIS — D50.9 CHRONIC IRON DEFICIENCY ANEMIA: ICD-10-CM

## 2024-06-25 DIAGNOSIS — F51.04 PSYCHOPHYSIOLOGICAL INSOMNIA: ICD-10-CM

## 2024-06-25 DIAGNOSIS — I50.20 HFREF (HEART FAILURE WITH REDUCED EJECTION FRACTION) (HCC): ICD-10-CM

## 2024-06-25 DIAGNOSIS — Z09 HOSPITAL DISCHARGE FOLLOW-UP: Primary | ICD-10-CM

## 2024-06-25 DIAGNOSIS — I25.10 CORONARY ARTERY DISEASE INVOLVING NATIVE CORONARY ARTERY OF NATIVE HEART WITHOUT ANGINA PECTORIS: ICD-10-CM

## 2024-06-25 DIAGNOSIS — F43.22 ADJUSTMENT REACTION WITH ANXIETY: ICD-10-CM

## 2024-06-25 DIAGNOSIS — D64.9 NORMOCYTIC ANEMIA: ICD-10-CM

## 2024-06-25 DIAGNOSIS — K21.9 GASTROESOPHAGEAL REFLUX DISEASE, UNSPECIFIED WHETHER ESOPHAGITIS PRESENT: ICD-10-CM

## 2024-06-25 DIAGNOSIS — G25.0 ESSENTIAL TREMOR: ICD-10-CM

## 2024-06-25 PROBLEM — R55 SYNCOPE: Status: RESOLVED | Noted: 2022-12-05 | Resolved: 2024-06-25

## 2024-06-25 PROCEDURE — 1036F TOBACCO NON-USER: CPT | Performed by: STUDENT IN AN ORGANIZED HEALTH CARE EDUCATION/TRAINING PROGRAM

## 2024-06-25 PROCEDURE — 3017F COLORECTAL CA SCREEN DOC REV: CPT | Performed by: STUDENT IN AN ORGANIZED HEALTH CARE EDUCATION/TRAINING PROGRAM

## 2024-06-25 PROCEDURE — 99214 OFFICE O/P EST MOD 30 MIN: CPT | Performed by: STUDENT IN AN ORGANIZED HEALTH CARE EDUCATION/TRAINING PROGRAM

## 2024-06-25 PROCEDURE — 3078F DIAST BP <80 MM HG: CPT | Performed by: STUDENT IN AN ORGANIZED HEALTH CARE EDUCATION/TRAINING PROGRAM

## 2024-06-25 PROCEDURE — G8427 DOCREV CUR MEDS BY ELIG CLIN: HCPCS | Performed by: STUDENT IN AN ORGANIZED HEALTH CARE EDUCATION/TRAINING PROGRAM

## 2024-06-25 PROCEDURE — 1123F ACP DISCUSS/DSCN MKR DOCD: CPT | Performed by: STUDENT IN AN ORGANIZED HEALTH CARE EDUCATION/TRAINING PROGRAM

## 2024-06-25 PROCEDURE — 1111F DSCHRG MED/CURRENT MED MERGE: CPT | Performed by: STUDENT IN AN ORGANIZED HEALTH CARE EDUCATION/TRAINING PROGRAM

## 2024-06-25 PROCEDURE — G8417 CALC BMI ABV UP PARAM F/U: HCPCS | Performed by: STUDENT IN AN ORGANIZED HEALTH CARE EDUCATION/TRAINING PROGRAM

## 2024-06-25 PROCEDURE — 1090F PRES/ABSN URINE INCON ASSESS: CPT | Performed by: STUDENT IN AN ORGANIZED HEALTH CARE EDUCATION/TRAINING PROGRAM

## 2024-06-25 PROCEDURE — 3075F SYST BP GE 130 - 139MM HG: CPT | Performed by: STUDENT IN AN ORGANIZED HEALTH CARE EDUCATION/TRAINING PROGRAM

## 2024-06-25 PROCEDURE — G2211 COMPLEX E/M VISIT ADD ON: HCPCS | Performed by: STUDENT IN AN ORGANIZED HEALTH CARE EDUCATION/TRAINING PROGRAM

## 2024-06-25 PROCEDURE — G8400 PT W/DXA NO RESULTS DOC: HCPCS | Performed by: STUDENT IN AN ORGANIZED HEALTH CARE EDUCATION/TRAINING PROGRAM

## 2024-06-25 RX ORDER — BUSPIRONE HYDROCHLORIDE 10 MG/1
10 TABLET ORAL 3 TIMES DAILY
Qty: 270 TABLET | Refills: 1 | Status: SHIPPED | OUTPATIENT
Start: 2024-06-25

## 2024-06-25 RX ORDER — GABAPENTIN 100 MG/1
100 CAPSULE ORAL DAILY
Qty: 90 CAPSULE | Refills: 1 | Status: SHIPPED | OUTPATIENT
Start: 2024-06-25 | End: 2024-12-22

## 2024-06-25 RX ORDER — RANOLAZINE 500 MG/1
500 TABLET, EXTENDED RELEASE ORAL 2 TIMES DAILY
Qty: 180 TABLET | Refills: 1 | Status: SHIPPED | OUTPATIENT
Start: 2024-06-25

## 2024-06-25 RX ORDER — ISOSORBIDE MONONITRATE 60 MG/1
60 TABLET, EXTENDED RELEASE ORAL DAILY
Qty: 90 TABLET | Refills: 1 | Status: SHIPPED | OUTPATIENT
Start: 2024-06-25

## 2024-06-25 RX ORDER — NITROGLYCERIN 0.4 MG/1
0.4 TABLET SUBLINGUAL EVERY 5 MIN PRN
Qty: 25 TABLET | Refills: 0 | Status: SHIPPED | OUTPATIENT
Start: 2024-06-25

## 2024-06-25 RX ORDER — OMEPRAZOLE 40 MG/1
40 CAPSULE, DELAYED RELEASE ORAL
Qty: 90 CAPSULE | Refills: 1 | Status: SHIPPED | OUTPATIENT
Start: 2024-06-25

## 2024-06-25 RX ORDER — DULOXETIN HYDROCHLORIDE 60 MG/1
60 CAPSULE, DELAYED RELEASE ORAL DAILY
Qty: 90 CAPSULE | Refills: 1 | Status: SHIPPED | OUTPATIENT
Start: 2024-06-25

## 2024-06-25 SDOH — ECONOMIC STABILITY: FOOD INSECURITY: WITHIN THE PAST 12 MONTHS, THE FOOD YOU BOUGHT JUST DIDN'T LAST AND YOU DIDN'T HAVE MONEY TO GET MORE.: NEVER TRUE

## 2024-06-25 SDOH — ECONOMIC STABILITY: FOOD INSECURITY: WITHIN THE PAST 12 MONTHS, YOU WORRIED THAT YOUR FOOD WOULD RUN OUT BEFORE YOU GOT MONEY TO BUY MORE.: NEVER TRUE

## 2024-06-25 SDOH — ECONOMIC STABILITY: INCOME INSECURITY: HOW HARD IS IT FOR YOU TO PAY FOR THE VERY BASICS LIKE FOOD, HOUSING, MEDICAL CARE, AND HEATING?: SOMEWHAT HARD

## 2024-06-25 NOTE — PATIENT INSTRUCTIONS
[employer]*  (Call United Way/211 if need more resources.)         HELP NETWORK OF [facility]:  What they do: Provides 24-hr, 7 days a week access to information on community resources for financial help. [facility] AND [facility]  Phone: 211 or [phone]    [facility]: [address], [place] [address]  What they offer: Limited assistance to restore/ prevent utility disconnection.  Phone Number: [phone]  Website: [employer]    DEPARTMENT OF JOB AND FAMILY SERVICES:  MAIN [facility] LINE FOR ALL [facility]: [phone]  What they do: [place] works first with temporary cash assistance if there are children in household.   [facility] DJFS: [address] [place] [address]  Phone: [phone], [phone]  [facility] DJFS: [address] [place] [address]  Phone: [phone]  [facility] DJFS: [address] [place] [address]  Phone: [phone]  Website: s.[place].[facility]    [employer] Financial Assistance  What they offer: Assistance with [employer] bills  Phone: 562.947.2750, option #5     Medications:  Good Rx  What they offer: Good [employer] tracks prescription drug prices and provides free drug coupons for discounts on medications.  Website: https://www.Testive    [employer]  What they offer: [employer] offers free information on medications and healthcare cost savings programs including prescription assistance programs, coupons, and discount programs.  Helpline: 318.342.4729  Website: https://www.Orte[employer].org    RX Assist  What they offer: Information about free and low-cost medicine programs.  Website: https://www.rxassist.org/    [employer] $4 Prescription Program  What they offer: Prescription Program includes up to a 30-day supply for $4 and a 90-day supply for $10 of some covered generic drugs at commonly prescribed dosages  Website: https://www.Stremor/cp/4-prescriptions/1460526    [employer]

## 2024-06-25 NOTE — PROGRESS NOTES
Post-Discharge Transitional Care Management Progress Note      Aria Sim   YOB: 1955    Date of Office Visit:  6/25/2024  Date of Hospital Admission: 6/11/2024  Date of Hospital Discharge: 6/14/2024    Care management risk score Rising risk (score 2-5) and Complex Care (Scores >=6): No Risk Score On File     Non face to face  following discharge, date last encounter closed (first attempt may have been earlier): *No documented post hospital discharge outreach found in the last 14 days *No documented post hospital discharge outreach found in the last 14 days    Call initiated 2 business days of discharge: *No response recorded in the last 14 days    ASSESSMENT/PLAN:   Hospital discharge follow-up  -     [place] DISCHARGE MEDS RECONCILED W/ CURRENT OUTPATIENT MED LIST  HFrEF (heart failure with reduced ejection fraction) (HCC)  Chronic respiratory failure with hypoxia (HCC)  Chronic iron deficiency anemia  Normocytic anemia  Adjustment reaction with anxiety  -     DULoxetine (CYMBALTA) 60 MG extended release capsule; Take 1 capsule by mouth daily, Disp-90 capsule, R-1Normal  -     busPIRone (BUSPAR) 10 MG tablet; Take 1 tablet by mouth 3 times daily, Disp-270 tablet, R-1Normal  Psychophysiological insomnia  Coronary artery disease involving native coronary artery of native heart without angina pectoris  -     ranolazine (RANEXA) 500 MG extended release tablet; Take 1 tablet by mouth 2 times daily, Disp-180 tablet, R-1Normal  -     nitroGLYCERIN (NITROSTAT) 0.4 MG SL tablet; Place 1 tablet under the tongue every 5 minutes as needed for Chest pain up to max of 3 total doses. If no relief after 1 dose, call 911., Disp-25 tablet, R-0Normal  -     isosorbide mononitrate (IMDUR) 60 MG extended release tablet; Take 1 tablet by mouth daily, Disp-90 tablet, R-1Normal  Essential tremor  -     gabapentin (NEURONTIN) 100 MG capsule; Take 1 capsule by mouth daily for 180 days., Disp-90 capsule,  Refer to ENT  Trial of flonase  Trial of astelin spray  Follow up in 2 months

## 2024-06-26 ENCOUNTER — TELEPHONE (OUTPATIENT)
Dept: PRIMARY CARE CLINIC | Age: 69
End: 2024-06-26

## 2024-06-26 NOTE — TELEPHONE ENCOUNTER
----- Message from Alfonzo Ring MD sent at 6/25/2024  4:26 PM EDT -----  Need labs from [facility] hospitalization 6/11-14  Need to know why her trazodone was stopped- not mentioned in H&P or discharge summar

## 2024-06-26 NOTE — PROGRESS NOTES
Please see 6/26 telephone encounter placed today, voicemail left for hospital and imaging coordinator at [facility], requested she call back at her earliest convenience per providers request for clarification on patient. Labs requested, will be sure to follow up.

## 2024-06-26 NOTE — TELEPHONE ENCOUNTER
Voicemail left for hospital and imaging coordinator Brisa at [facility], requested she call back at her earliest convenience per providers request for clarification on patient RX. Labs were requested from medical records, will be sure to follow up.     CONCHITA Crespo.

## 2024-06-27 NOTE — TELEPHONE ENCOUNTER
Someone closed this encounter but I need this open so I can remember to follow up on receiving a returned call from [facility] hospital coordinator Brisa with clarification for doctor, please leave open.

## 2024-07-02 RX ORDER — CARVEDILOL 25 MG/1
25 TABLET ORAL 2 TIMES DAILY WITH MEALS
COMMUNITY

## 2024-07-03 DIAGNOSIS — M1A.3791 CHRONIC GOUT DUE TO RENAL IMPAIRMENT INVOLVING FOOT WITH TOPHUS, UNSPECIFIED LATERALITY: Primary | ICD-10-CM

## 2024-07-03 RX ORDER — FEBUXOSTAT 40 MG/1
40 TABLET, FILM COATED ORAL DAILY
Qty: 90 TABLET | Refills: 1 | Status: SHIPPED | OUTPATIENT
Start: 2024-07-03

## 2024-07-03 NOTE — TELEPHONE ENCOUNTER
Patients last appointment 6/25/2024.  Patients next scheduled appointment   Future Appointments   Date Time Provider Department Center   10/1/2024  3:30 PM Alfonzo Ring MD [facility]

## 2024-07-11 DIAGNOSIS — M1A.3791 CHRONIC GOUT DUE TO RENAL IMPAIRMENT INVOLVING FOOT WITH TOPHUS, UNSPECIFIED LATERALITY: ICD-10-CM

## 2024-07-11 RX ORDER — FEBUXOSTAT 40 MG/1
40 TABLET, FILM COATED ORAL DAILY
Qty: 90 TABLET | Refills: 1 | OUTPATIENT
Start: 2024-07-11

## 2024-07-15 ENCOUNTER — TELEPHONE (OUTPATIENT)
Dept: PRIMARY CARE CLINIC | Age: 69
End: 2024-07-15

## 2024-07-15 DIAGNOSIS — G25.0 ESSENTIAL TREMOR: Primary | ICD-10-CM

## 2024-07-15 RX ORDER — PRIMIDONE 50 MG/1
50 TABLET ORAL NIGHTLY
Qty: 90 TABLET | Refills: 0 | Status: SHIPPED | OUTPATIENT
Start: 2024-07-15

## 2024-07-15 NOTE — TELEPHONE ENCOUNTER
Aria's  called to see if the doctor can prescriber her something for shaking. He stated on his VM to me that it has been bad the last 2 days and to call him back around 10 a.m. if I had any questions.   Please advise .     Called back at 10 a.m. to see how she was doing. Mr. Sim states that she is eating okay and this is is not a new thing but it is much worse than it was. Pt has not been outside or in the heat.

## 2024-07-15 NOTE — TELEPHONE ENCOUNTER
Called patient and , they were referring to her hand tremor. She would like to treat this now as it seems to be getting worse which is the natural progression of essential tremor. Already on beta blocker. Add primidone 50 mg nightly. Call if side effects or not getting better.

## 2024-07-17 DIAGNOSIS — I25.10 CORONARY ARTERY DISEASE INVOLVING NATIVE CORONARY ARTERY OF NATIVE HEART WITHOUT ANGINA PECTORIS: Primary | ICD-10-CM

## 2024-07-17 DIAGNOSIS — M1A.3791 CHRONIC GOUT DUE TO RENAL IMPAIRMENT INVOLVING FOOT WITH TOPHUS, UNSPECIFIED LATERALITY: ICD-10-CM

## 2024-07-17 RX ORDER — FEBUXOSTAT 40 MG/1
40 TABLET, FILM COATED ORAL DAILY
Qty: 90 TABLET | Refills: 1 | Status: SHIPPED | OUTPATIENT
Start: 2024-07-17

## 2024-07-17 RX ORDER — ATORVASTATIN CALCIUM 40 MG/1
TABLET, FILM COATED ORAL
Qty: 90 TABLET | Refills: 1 | Status: SHIPPED | OUTPATIENT
Start: 2024-07-17

## 2024-07-17 NOTE — TELEPHONE ENCOUNTER
Patients last appointment 6/25/2024.  Patients next scheduled appointment   Future Appointments   Date Time Provider Department Center   10/1/2024  3:30 PM Alfonzo Ring MD [facility]

## 2024-07-20 LAB
BASOPHILS ABSOLUTE: NORMAL
BASOPHILS RELATIVE PERCENT: NORMAL
BUN BLDV-MCNC: NORMAL MG/DL
CALCIUM SERPL-MCNC: NORMAL MG/DL
CHLORIDE BLD-SCNC: NORMAL MMOL/L
CO2: NORMAL
CREAT SERPL-MCNC: NORMAL MG/DL
EGFR: 23.5
EOSINOPHILS ABSOLUTE: NORMAL
EOSINOPHILS RELATIVE PERCENT: NORMAL
GLUCOSE BLD-MCNC: NORMAL MG/DL
HCT VFR BLD CALC: NORMAL %
HEMOGLOBIN: NORMAL
LYMPHOCYTES ABSOLUTE: NORMAL
LYMPHOCYTES RELATIVE PERCENT: NORMAL
MCH RBC QN AUTO: NORMAL PG
MCHC RBC AUTO-ENTMCNC: NORMAL G/DL
MCV RBC AUTO: NORMAL FL
MONOCYTES ABSOLUTE: NORMAL
MONOCYTES RELATIVE PERCENT: NORMAL
NEUTROPHILS ABSOLUTE: NORMAL
NEUTROPHILS RELATIVE PERCENT: NORMAL
PLATELET # BLD: NORMAL 10*3/UL
PMV BLD AUTO: NORMAL FL
POTASSIUM SERPL-SCNC: NORMAL MMOL/L
RBC # BLD: NORMAL 10*6/UL
SODIUM BLD-SCNC: NORMAL MMOL/L
WBC # BLD: NORMAL 10*3/UL

## 2024-07-25 DIAGNOSIS — J96.11 CHRONIC RESPIRATORY FAILURE WITH HYPOXIA (HCC): ICD-10-CM

## 2024-07-25 DIAGNOSIS — M1A.3791 CHRONIC GOUT DUE TO RENAL IMPAIRMENT INVOLVING FOOT WITH TOPHUS, UNSPECIFIED LATERALITY: Primary | ICD-10-CM

## 2024-07-25 DIAGNOSIS — I50.20 HFREF (HEART FAILURE WITH REDUCED EJECTION FRACTION) (HCC): ICD-10-CM

## 2024-07-25 DIAGNOSIS — G25.0 ESSENTIAL TREMOR: ICD-10-CM

## 2024-07-25 RX ORDER — CARVEDILOL 25 MG/1
25 TABLET ORAL 2 TIMES DAILY WITH MEALS
Qty: 60 TABLET | Status: CANCELLED | OUTPATIENT
Start: 2024-07-25

## 2024-07-25 RX ORDER — LEVOTHYROXINE SODIUM 88 UG/1
TABLET ORAL
Qty: 30 TABLET | Status: CANCELLED | OUTPATIENT
Start: 2024-07-25

## 2024-07-25 NOTE — TELEPHONE ENCOUNTER
Patients last appointment 6/25/2024.  Patients next scheduled appointment   Future Appointments   Date Time Provider Department Center   10/1/2024  3:30 PM Alfonzo Ring MD [facility]

## 2024-07-26 RX ORDER — CARVEDILOL 25 MG/1
25 TABLET ORAL 2 TIMES DAILY WITH MEALS
Qty: 180 TABLET | Refills: 0 | Status: SHIPPED | OUTPATIENT
Start: 2024-07-26

## 2024-07-26 RX ORDER — LEVOTHYROXINE SODIUM 88 UG/1
88 TABLET ORAL DAILY
Qty: 90 TABLET | Refills: 0 | Status: SHIPPED | OUTPATIENT
Start: 2024-07-26

## 2024-07-26 NOTE — TELEPHONE ENCOUNTER
Patients last appointment 6/25/2024.  Patients next scheduled appointment   Future Appointments   Date Time Provider Department Center   10/1/2024  3:30 PM Alfonzo Ring MD [facility]

## 2024-07-31 ENCOUNTER — TELEPHONE (OUTPATIENT)
Dept: PRIMARY CARE CLINIC | Age: 69
End: 2024-07-31

## 2024-07-31 NOTE — TELEPHONE ENCOUNTER
Called Mary for prior authorization for febuxostat 40mg .   Case # [id number]   Fax# [phone]    They will fax or call with approval or denial

## 2024-08-22 ENCOUNTER — TELEPHONE (OUTPATIENT)
Dept: PRIMARY CARE CLINIC | Age: 69
End: 2024-08-22

## 2024-08-22 DIAGNOSIS — I50.20 HFREF (HEART FAILURE WITH REDUCED EJECTION FRACTION) (HCC): Primary | ICD-10-CM

## 2024-08-22 DIAGNOSIS — I25.10 CORONARY ARTERY DISEASE INVOLVING NATIVE CORONARY ARTERY OF NATIVE HEART WITHOUT ANGINA PECTORIS: ICD-10-CM

## 2024-08-22 RX ORDER — BUMETANIDE 2 MG/1
2 TABLET ORAL 2 TIMES DAILY
Qty: 180 TABLET | Refills: 1 | Status: SHIPPED | OUTPATIENT
Start: 2024-08-22

## 2024-08-22 RX ORDER — ATORVASTATIN CALCIUM 40 MG/1
40 TABLET, FILM COATED ORAL DAILY
Qty: 90 TABLET | Refills: 1 | Status: SHIPPED | OUTPATIENT
Start: 2024-08-22

## 2024-09-10 DIAGNOSIS — F43.22 ADJUSTMENT REACTION WITH ANXIETY: ICD-10-CM

## 2024-09-10 DIAGNOSIS — I25.10 CORONARY ARTERY DISEASE INVOLVING NATIVE CORONARY ARTERY OF NATIVE HEART WITHOUT ANGINA PECTORIS: ICD-10-CM

## 2024-09-10 DIAGNOSIS — M1A.3791 CHRONIC GOUT DUE TO RENAL IMPAIRMENT INVOLVING FOOT WITH TOPHUS, UNSPECIFIED LATERALITY: ICD-10-CM

## 2024-09-10 RX ORDER — CALCITRIOL 0.25 UG/1
0.25 CAPSULE, LIQUID FILLED ORAL
COMMUNITY
Start: 2024-06-26

## 2024-09-11 RX ORDER — DULOXETIN HYDROCHLORIDE 60 MG/1
60 CAPSULE, DELAYED RELEASE ORAL DAILY
Qty: 90 CAPSULE | Refills: 1 | OUTPATIENT
Start: 2024-09-11

## 2024-09-11 RX ORDER — FEBUXOSTAT 40 MG/1
40 TABLET, FILM COATED ORAL DAILY
Qty: 90 TABLET | Refills: 1 | OUTPATIENT
Start: 2024-09-11

## 2024-09-11 RX ORDER — ISOSORBIDE MONONITRATE 60 MG/1
60 TABLET, EXTENDED RELEASE ORAL DAILY
Qty: 90 TABLET | Refills: 1 | OUTPATIENT
Start: 2024-09-11

## 2024-09-12 ENCOUNTER — OFFICE VISIT (OUTPATIENT)
Dept: PRIMARY CARE CLINIC | Age: 69
End: 2024-09-12
Payer: COMMERCIAL

## 2024-09-12 VITALS
OXYGEN SATURATION: 95 % | BODY MASS INDEX: 35.12 KG/M2 | HEIGHT: 61 IN | RESPIRATION RATE: 20 BRPM | DIASTOLIC BLOOD PRESSURE: 80 MMHG | HEART RATE: 69 BPM | WEIGHT: 186 LBS | TEMPERATURE: 97 F | SYSTOLIC BLOOD PRESSURE: 122 MMHG

## 2024-09-12 DIAGNOSIS — Z20.822 EXPOSURE TO COVID-19 VIRUS: ICD-10-CM

## 2024-09-12 DIAGNOSIS — R22.43 LOCALIZED SWELLING OF BOTH LOWER LEGS: ICD-10-CM

## 2024-09-12 DIAGNOSIS — R06.02 SHORTNESS OF BREATH: ICD-10-CM

## 2024-09-12 DIAGNOSIS — U07.1 COVID-19: Primary | ICD-10-CM

## 2024-09-12 LAB
INFLUENZA A ANTIGEN, POC: NEGATIVE
INFLUENZA B ANTIGEN, POC: NEGATIVE
LOT EXPIRE DATE: ABNORMAL
LOT KIT NUMBER: ABNORMAL
SARS-COV-2, POC: DETECTED
VALID INTERNAL CONTROL: ABNORMAL
VENDOR AND KIT NAME POC: ABNORMAL

## 2024-09-12 PROCEDURE — 3079F DIAST BP 80-89 MM HG: CPT

## 2024-09-12 PROCEDURE — 3074F SYST BP LT 130 MM HG: CPT

## 2024-09-12 PROCEDURE — 99214 OFFICE O/P EST MOD 30 MIN: CPT

## 2024-09-12 PROCEDURE — 87428 SARSCOV & INF VIR A&B AG IA: CPT

## 2024-09-12 PROCEDURE — 1123F ACP DISCUSS/DSCN MKR DOCD: CPT

## 2024-10-01 ENCOUNTER — OFFICE VISIT (OUTPATIENT)
Dept: PRIMARY CARE CLINIC | Age: 69
End: 2024-10-01

## 2024-10-01 VITALS
HEIGHT: 61 IN | BODY MASS INDEX: 33.08 KG/M2 | WEIGHT: 175.2 LBS | SYSTOLIC BLOOD PRESSURE: 122 MMHG | OXYGEN SATURATION: 98 % | DIASTOLIC BLOOD PRESSURE: 60 MMHG | TEMPERATURE: 97 F | HEART RATE: 67 BPM

## 2024-10-01 DIAGNOSIS — T50.905A DRUG-INDUCED NAUSEA AND VOMITING: ICD-10-CM

## 2024-10-01 DIAGNOSIS — J96.11 CHRONIC RESPIRATORY FAILURE WITH HYPOXIA: ICD-10-CM

## 2024-10-01 DIAGNOSIS — I10 ESSENTIAL HYPERTENSION: ICD-10-CM

## 2024-10-01 DIAGNOSIS — K21.9 GASTROESOPHAGEAL REFLUX DISEASE, UNSPECIFIED WHETHER ESOPHAGITIS PRESENT: ICD-10-CM

## 2024-10-01 DIAGNOSIS — Z23 NEED FOR PNEUMOCOCCAL VACCINATION: ICD-10-CM

## 2024-10-01 DIAGNOSIS — F43.22 ADJUSTMENT REACTION WITH ANXIETY: ICD-10-CM

## 2024-10-01 DIAGNOSIS — E03.9 ACQUIRED HYPOTHYROIDISM: ICD-10-CM

## 2024-10-01 DIAGNOSIS — F51.04 PSYCHOPHYSIOLOGICAL INSOMNIA: ICD-10-CM

## 2024-10-01 DIAGNOSIS — G25.0 ESSENTIAL TREMOR: ICD-10-CM

## 2024-10-01 DIAGNOSIS — N17.9 AKI (ACUTE KIDNEY INJURY) (HCC): ICD-10-CM

## 2024-10-01 DIAGNOSIS — R11.2 DRUG-INDUCED NAUSEA AND VOMITING: ICD-10-CM

## 2024-10-01 DIAGNOSIS — D50.9 CHRONIC IRON DEFICIENCY ANEMIA: ICD-10-CM

## 2024-10-01 DIAGNOSIS — J12.82 PNEUMONIA DUE TO COVID-19 VIRUS: ICD-10-CM

## 2024-10-01 DIAGNOSIS — Z09 HOSPITAL DISCHARGE FOLLOW-UP: Primary | ICD-10-CM

## 2024-10-01 DIAGNOSIS — N18.4 STAGE 4 CHRONIC KIDNEY DISEASE (HCC): ICD-10-CM

## 2024-10-01 DIAGNOSIS — U07.1 PNEUMONIA DUE TO COVID-19 VIRUS: ICD-10-CM

## 2024-10-01 DIAGNOSIS — Z23 NEED FOR IMMUNIZATION AGAINST INFLUENZA: ICD-10-CM

## 2024-10-01 DIAGNOSIS — I50.20 HFREF (HEART FAILURE WITH REDUCED EJECTION FRACTION) (HCC): ICD-10-CM

## 2024-10-01 DIAGNOSIS — F32.0 MILD MAJOR DEPRESSION (HCC): ICD-10-CM

## 2024-10-01 DIAGNOSIS — M1A.3791 CHRONIC GOUT DUE TO RENAL IMPAIRMENT INVOLVING FOOT WITH TOPHUS, UNSPECIFIED LATERALITY: ICD-10-CM

## 2024-10-01 DIAGNOSIS — I25.10 CORONARY ARTERY DISEASE INVOLVING NATIVE CORONARY ARTERY OF NATIVE HEART WITHOUT ANGINA PECTORIS: ICD-10-CM

## 2024-10-01 RX ORDER — BENZONATATE 200 MG/1
200 CAPSULE ORAL 3 TIMES DAILY PRN
Qty: 90 CAPSULE | Refills: 0 | Status: SHIPPED | OUTPATIENT
Start: 2024-10-01

## 2024-10-01 RX ORDER — ONDANSETRON 4 MG/1
TABLET, ORALLY DISINTEGRATING ORAL
COMMUNITY
Start: 2024-09-20 | End: 2024-10-01 | Stop reason: SDUPTHER

## 2024-10-01 RX ORDER — FAMOTIDINE 20 MG/1
20 TABLET, FILM COATED ORAL NIGHTLY
Qty: 90 TABLET | Refills: 0 | Status: SHIPPED | OUTPATIENT
Start: 2024-10-01

## 2024-10-01 RX ORDER — BENZONATATE 200 MG/1
CAPSULE ORAL
COMMUNITY
Start: 2024-09-12 | End: 2024-10-01 | Stop reason: SDUPTHER

## 2024-10-01 RX ORDER — ONDANSETRON 4 MG/1
8 TABLET, ORALLY DISINTEGRATING ORAL EVERY 8 HOURS PRN
Qty: 90 TABLET | Refills: 0 | Status: SHIPPED | OUTPATIENT
Start: 2024-10-01

## 2024-10-04 DIAGNOSIS — G25.0 ESSENTIAL TREMOR: ICD-10-CM

## 2024-10-04 RX ORDER — PRIMIDONE 50 MG/1
50 TABLET ORAL NIGHTLY
Qty: 90 TABLET | Refills: 1 | Status: SHIPPED | OUTPATIENT
Start: 2024-10-04

## 2024-10-04 NOTE — TELEPHONE ENCOUNTER
Patients last appointment 10/1/2024.  Patients next scheduled appointment   Future Appointments   Date Time Provider Department Center   1/21/2025  3:00 PM Alfonzo Ring MD [facility] DE[facility]

## 2024-10-04 NOTE — PROGRESS NOTES
ALL sept to oct 2024 hospital records received from [facility].  
Requested.  
for Chest pain up to max of 3 total doses. If no relief after 1 dose, call 911. 25 tablet 0    omeprazole (PRILOSEC) 40 MG delayed release capsule Take 1 capsule by mouth every morning (before breakfast) 90 capsule 1    isosorbide mononitrate (IMDUR) 60 MG extended release tablet Take 1 tablet by mouth daily 90 tablet 1    busPIRone (BUSPAR) 10 MG tablet Take 1 tablet by mouth 3 times daily 270 tablet 1    ipratropium 0.5 mg-albuterol 2.5 mg (DUONEB) 0.5-2.5 (3) MG/3ML SOLN nebulizer solution USE 1 VIAL VIA NEBULIZER EVERY 4 HOURS AS NEEDED FOR DIFFICULTY BREATHING OR SHORTNESS OF BREATH      Continuous Blood Gluc  (DEXCOM G7 ) ELIZABETH by Does not apply route      Continuous Blood Gluc Sensor (DEXCOM G7 SENSOR) MISC by Does not apply route      amLODIPine (NORVASC) 10 MG tablet Take 1 tablet by mouth daily      nystatin (MYCOSTATIN) 437103 UNIT/GM powder APPLY TOPICALLY THREE TIMES A DAY 60 g 10    nystatin (MYCOSTATIN) 706676 UNIT/GM cream APPLY TO AFFECTED AREA TOPICALLY TWICE DAILY 30 g 10    acetaminophen (TYLENOL) 500 MG tablet Take 1 tablet by mouth every 6 hours as needed for Pain      SEMGLEE, YFGN, 100 UNIT/ML SOPN Take 50 units daily      insulin lispro, 1 Unit Dial, (HUMALOG/ADMELOG) 100 UNIT/ML SOPN Inject into the skin 16 U with meals      B-D UF III MINI PEN NEEDLES 31G X 5 MM MISC USE 4 NEEDLES DAILY FOR INSULIN INJECTIONS      Melatonin 10 MG TABS Take by mouth      OXYGEN 2 L/min by CPAP route continuous      fluticasone (FLONASE) 50 MCG/ACT nasal spray SHAKE LIQUID AND USE 2 SPRAYS IN EACH NOSTRIL DAILY 48 g 1    tobramycin (TOBREX) 0.3 % ophthalmic solution       metOLazone (ZAROXOLYN) 2.5 MG tablet every 24 hours      brimonidine (ALPHAGAN) 0.2 % ophthalmic solution       hydrALAZINE (APRESOLINE) 50 MG tablet Take 2 tablets, 3 times daily      latanoprost (XALATAN) 0.005 % ophthalmic solution       aspirin 81 MG chewable tablet 1 tablet      vitamin D 25 MCG (1000 UT) CAPS Take by

## 2024-10-07 DIAGNOSIS — I25.10 CORONARY ARTERY DISEASE INVOLVING NATIVE CORONARY ARTERY OF NATIVE HEART WITHOUT ANGINA PECTORIS: ICD-10-CM

## 2024-10-07 RX ORDER — CARVEDILOL 25 MG/1
25 TABLET ORAL 2 TIMES DAILY WITH MEALS
Qty: 180 TABLET | Refills: 1 | Status: SHIPPED | OUTPATIENT
Start: 2024-10-07

## 2024-10-07 RX ORDER — LEVOTHYROXINE SODIUM 88 UG/1
88 TABLET ORAL DAILY
Qty: 90 TABLET | Refills: 1 | Status: SHIPPED | OUTPATIENT
Start: 2024-10-07

## 2024-10-07 RX ORDER — RANOLAZINE 500 MG/1
500 TABLET, EXTENDED RELEASE ORAL 2 TIMES DAILY
Qty: 180 TABLET | Refills: 1 | OUTPATIENT
Start: 2024-10-07

## 2024-10-07 RX ORDER — LEVOTHYROXINE SODIUM 88 UG/1
88 TABLET ORAL DAILY
Qty: 90 TABLET | Refills: 0 | OUTPATIENT
Start: 2024-10-07

## 2024-10-07 RX ORDER — GABAPENTIN 100 MG/1
100 CAPSULE ORAL DAILY
Qty: 90 CAPSULE | Refills: 1 | Status: SHIPPED | OUTPATIENT
Start: 2024-10-07 | End: 2025-04-05

## 2024-10-07 RX ORDER — CARVEDILOL 25 MG/1
25 TABLET ORAL 2 TIMES DAILY WITH MEALS
Qty: 180 TABLET | Refills: 0 | OUTPATIENT
Start: 2024-10-07

## 2024-10-07 RX ORDER — BUSPIRONE HYDROCHLORIDE 10 MG/1
10 TABLET ORAL 3 TIMES DAILY
Qty: 270 TABLET | Refills: 1 | Status: SHIPPED | OUTPATIENT
Start: 2024-10-07

## 2024-10-07 RX ORDER — ISOSORBIDE MONONITRATE 60 MG/1
60 TABLET, EXTENDED RELEASE ORAL DAILY
Qty: 90 TABLET | Refills: 1 | Status: SHIPPED | OUTPATIENT
Start: 2024-10-07

## 2024-10-07 RX ORDER — RANOLAZINE 500 MG/1
500 TABLET, EXTENDED RELEASE ORAL 2 TIMES DAILY
Qty: 180 TABLET | Refills: 1 | Status: SHIPPED | OUTPATIENT
Start: 2024-10-07

## 2024-10-07 RX ORDER — DULOXETIN HYDROCHLORIDE 60 MG/1
60 CAPSULE, DELAYED RELEASE ORAL DAILY
Qty: 90 CAPSULE | Refills: 1 | Status: SHIPPED | OUTPATIENT
Start: 2024-10-07

## 2024-10-07 RX ORDER — TRAZODONE HYDROCHLORIDE 50 MG/1
50 TABLET, FILM COATED ORAL NIGHTLY
Qty: 90 TABLET | Refills: 1 | Status: SHIPPED | OUTPATIENT
Start: 2024-10-07

## 2024-10-07 RX ORDER — FEBUXOSTAT 40 MG/1
40 TABLET, FILM COATED ORAL DAILY
Qty: 90 TABLET | Refills: 1 | Status: SHIPPED | OUTPATIENT
Start: 2024-10-07

## 2024-10-07 RX ORDER — OMEPRAZOLE 40 MG/1
40 CAPSULE, DELAYED RELEASE ORAL
Qty: 90 CAPSULE | Refills: 1 | Status: SHIPPED | OUTPATIENT
Start: 2024-10-07

## 2024-10-07 NOTE — TELEPHONE ENCOUNTER
Patients last appointment 10/1/2024.  Patients next scheduled appointment   Future Appointments   Date Time Provider Department Center   1/21/2025  3:00 PM Alfonzo Ring MD [facility]

## 2024-10-08 DIAGNOSIS — E03.9 ACQUIRED HYPOTHYROIDISM: ICD-10-CM

## 2024-10-08 RX ORDER — LEVOTHYROXINE SODIUM 88 UG/1
88 TABLET ORAL DAILY
Qty: 90 TABLET | Refills: 1 | OUTPATIENT
Start: 2024-10-08

## 2024-10-08 RX ORDER — AMLODIPINE BESYLATE 10 MG/1
10 TABLET ORAL DAILY
Qty: 90 TABLET | Refills: 1 | Status: SHIPPED | OUTPATIENT
Start: 2024-10-08

## 2024-10-09 ASSESSMENT — ENCOUNTER SYMPTOMS
DIARRHEA: 0
ABDOMINAL PAIN: 0
NAUSEA: 1
CONSTIPATION: 0
SHORTNESS OF BREATH: 0
VOMITING: 0
ABDOMINAL DISTENTION: 0
BLOOD IN STOOL: 0
COUGH: 1

## 2024-10-10 NOTE — ASSESSMENT & PLAN NOTE
Recent JERI due to COVID, dehydration  Returned to baseline prior to discharge  Follows with nephro  Would consider peritoneal dialysis in future if needed

## 2024-10-10 NOTE — ASSESSMENT & PLAN NOTE
Chronic, stable, euvolemic  Follows with cardio  Continue Bumex 2 mg twice daily, carvedilol 25 mg twice daily, hydralazine 100 mg 3 times daily, Imdur XR 60 mg daily, metolazone 2.5 mg daily

## 2024-10-10 NOTE — ASSESSMENT & PLAN NOTE
Chronic, improved, persistent grief  Continue Cymbalta 60 mg daily, Buspar 10 mg 3 times daily as needed

## 2024-10-10 NOTE — ASSESSMENT & PLAN NOTE
Chronic, controlled  Continue Bumex 2 mg twice daily, carvedilol 25 mg twice daily, hydralazine 100 mg 3 times daily, Imdur XR 60 mg daily, metolazone 2.5 mg daily

## 2024-10-10 NOTE — ASSESSMENT & PLAN NOTE
Chronic, stable, asymptomatic  Follows with cardio  Continue aspirin 81 mg daily, atorvastatin 40 mg daily, carvedilol 25 mg twice daily, Imdur XR 60 mg daily, Ranexa 500 mg twice daily

## 2024-10-10 NOTE — ASSESSMENT & PLAN NOTE
Chronic, controlled  But with persistent nausea s/p Procrit  Add famotidine 20 mg nightly  Continue omeprazole 40 mg daily

## 2024-10-10 NOTE — ASSESSMENT & PLAN NOTE
Chronic, stable  Continue oxygen 2 LPM   [Home] : at home, [unfilled] , at the time of the visit. [Other Location: e.g. Home (Enter Location, City,State)___] : at [unfilled] [Verbal consent obtained from patient] : the patient, [unfilled] [FreeTextEntry1] : Patient presents for weight loss and overweight/obese comorbidity management\par \par doing well, losing some weight\par following keto, getting fiber from greens\par having good energy\par not weighing self\par recently 248 which is similar to October 2022\par unclear degree of WL so far based on report\par wants to go back to wegovy 2.4 mg\par \par Due to comorbidities this patient requires medical treatment for overweight / obesity\par

## 2024-10-28 ENCOUNTER — TELEPHONE (OUTPATIENT)
Dept: PRIMARY CARE CLINIC | Age: 69
End: 2024-10-28

## 2024-10-28 DIAGNOSIS — G25.0 ESSENTIAL TREMOR: ICD-10-CM

## 2024-10-28 NOTE — TELEPHONE ENCOUNTER
Erick stopped into the office today and stated \" Aria is shaking and can't even stand up when she takes this medication (Primidone). Her legs give out on her even when I give it to her at night and she can't  the morning. Could the  give her something else to replace it?\"  Please advise.   Phone [phone]

## 2024-10-28 NOTE — TELEPHONE ENCOUNTER
Are they sure this is related to the primidone medication? She has been on this for about 4 months.    If so, we are using this in addition to her gabapentin for her tremor so could consider stopping this and just continuing gabapentin or increasing gabapentin slightly.

## 2024-10-29 RX ORDER — GABAPENTIN 100 MG/1
100 CAPSULE ORAL 2 TIMES DAILY
Qty: 180 CAPSULE | Refills: 1 | Status: SHIPPED | OUTPATIENT
Start: 2024-10-29 | End: 2025-04-27

## 2024-10-29 NOTE — TELEPHONE ENCOUNTER
Left a message with Aria to have Erick return my call. She was not sure of the answers to my questions.

## 2024-10-29 NOTE — TELEPHONE ENCOUNTER
Spoke with Erick. He will give her gabapentin once in the afternoon (Usual dose) and an extra one in the evening . Primidone will be discontinued.

## 2024-11-27 ENCOUNTER — COMMUNITY OUTREACH (OUTPATIENT)
Dept: PRIMARY CARE CLINIC | Age: 69
End: 2024-11-27

## 2024-11-27 NOTE — PROGRESS NOTES
Patient's HM shows they are overdue for A1C.  Care Everywhere and  files searched.  No results to attach to order nor HM updated.

## 2024-12-18 DIAGNOSIS — I50.20 HFREF (HEART FAILURE WITH REDUCED EJECTION FRACTION) (HCC): ICD-10-CM

## 2024-12-18 DIAGNOSIS — J12.82 PNEUMONIA DUE TO COVID-19 VIRUS: ICD-10-CM

## 2024-12-18 DIAGNOSIS — K21.9 GASTROESOPHAGEAL REFLUX DISEASE, UNSPECIFIED WHETHER ESOPHAGITIS PRESENT: ICD-10-CM

## 2024-12-18 DIAGNOSIS — R11.2 DRUG-INDUCED NAUSEA AND VOMITING: ICD-10-CM

## 2024-12-18 DIAGNOSIS — T50.905A DRUG-INDUCED NAUSEA AND VOMITING: ICD-10-CM

## 2024-12-18 DIAGNOSIS — U07.1 PNEUMONIA DUE TO COVID-19 VIRUS: ICD-10-CM

## 2024-12-18 NOTE — TELEPHONE ENCOUNTER
Name of Medication(s) Requested:  Requested Prescriptions     Pending Prescriptions Disp Refills    bumetanide (BUMEX) 2 MG tablet [Pharmacy Med Name: BUMETANIDE 2MG TABLETS] 180 tablet 1     Sig: TAKE 1 TABLET BY MOUTH TWICE DAILY    benzonatate (TESSALON) 200 MG capsule [Pharmacy Med Name: BENZONATATE 200MG CAPSULES] 90 capsule 0     Sig: TAKE 1 CAPSULE BY MOUTH THREE TIMES DAILY AS NEEDED FOR COUGH    famotidine (PEPCID) 20 MG tablet [Pharmacy Med Name: FAMOTIDINE 20MG TABLETS] 90 tablet 1     Sig: TAKE 1 TABLET BY MOUTH AT BEDTIME       Medication is on current medication list Yes    Dosage and directions were verified? Yes    Quantity verified: 90 day supply     Pharmacy Verified?  Yes    Last Appointment:  10/1/2024    Future appts:  No future appointments.     (If no appt send self scheduling link. .REFILLAPPT)  Scheduling request sent?     [] Yes  [x] No    Does patient need updated?  [] Yes  [x] No

## 2024-12-19 RX ORDER — FAMOTIDINE 20 MG/1
20 TABLET, FILM COATED ORAL NIGHTLY
Qty: 90 TABLET | Refills: 1 | Status: SHIPPED | OUTPATIENT
Start: 2024-12-19

## 2024-12-19 RX ORDER — BUMETANIDE 2 MG/1
2 TABLET ORAL 2 TIMES DAILY
Qty: 180 TABLET | Refills: 1 | Status: SHIPPED | OUTPATIENT
Start: 2024-12-19

## 2024-12-19 RX ORDER — BENZONATATE 200 MG/1
200 CAPSULE ORAL 3 TIMES DAILY PRN
Qty: 90 CAPSULE | Refills: 0 | Status: SHIPPED | OUTPATIENT
Start: 2024-12-19

## 2025-02-14 ENCOUNTER — TELEPHONE (OUTPATIENT)
Dept: PRIMARY CARE CLINIC | Age: 70
End: 2025-02-14

## 2025-02-14 NOTE — TELEPHONE ENCOUNTER
[facility]  Phone [phone]  Skilled and physical therapy.   I gave a verbal approval  for Tiago to follow.

## 2025-02-16 DIAGNOSIS — I25.10 CORONARY ARTERY DISEASE INVOLVING NATIVE CORONARY ARTERY OF NATIVE HEART WITHOUT ANGINA PECTORIS: ICD-10-CM

## 2025-02-17 RX ORDER — ATORVASTATIN CALCIUM 40 MG/1
40 TABLET, FILM COATED ORAL DAILY
Qty: 90 TABLET | Refills: 1 | Status: SHIPPED | OUTPATIENT
Start: 2025-02-17

## 2025-02-17 NOTE — TELEPHONE ENCOUNTER
Name of Medication(s) Requested:  Requested Prescriptions     Pending Prescriptions Disp Refills    atorvastatin (LIPITOR) 40 MG tablet [Pharmacy Med Name: ATORVASTATIN 40MG TABLETS] 90 tablet 1     Sig: TAKE 1 TABLET BY MOUTH DAILY       Medication is on current medication list Yes    Dosage and directions were verified? Yes    Quantity verified: 90 day supply     Pharmacy Verified?  Yes    Last Appointment:  10/1/2024    Future appts:  Future Appointments   Date Time Provider Department Center   2/27/2025  8:30 AM Alondra Ordoñez APRN - CNP [facility] DE[facility]        (If no appt send self scheduling link. .REFILLAPPT)  Scheduling request sent?     [] Yes  [x] No    Does patient need updated?  [] Yes  [x] No

## 2025-02-27 ENCOUNTER — OFFICE VISIT (OUTPATIENT)
Dept: PRIMARY CARE CLINIC | Age: 70
End: 2025-02-27

## 2025-02-27 VITALS
BODY MASS INDEX: 35.68 KG/M2 | OXYGEN SATURATION: 97 % | TEMPERATURE: 97 F | HEIGHT: 61 IN | SYSTOLIC BLOOD PRESSURE: 112 MMHG | DIASTOLIC BLOOD PRESSURE: 60 MMHG | WEIGHT: 189 LBS | HEART RATE: 65 BPM

## 2025-02-27 DIAGNOSIS — I50.20 HFREF (HEART FAILURE WITH REDUCED EJECTION FRACTION) (HCC): Primary | ICD-10-CM

## 2025-02-27 DIAGNOSIS — Z79.4 TYPE 2 DIABETES MELLITUS WITH STAGE 4 CHRONIC KIDNEY DISEASE, WITH LONG-TERM CURRENT USE OF INSULIN (HCC): ICD-10-CM

## 2025-02-27 DIAGNOSIS — E78.49 OTHER HYPERLIPIDEMIA: ICD-10-CM

## 2025-02-27 DIAGNOSIS — E53.8 B12 DEFICIENCY: ICD-10-CM

## 2025-02-27 DIAGNOSIS — N18.4 TYPE 2 DIABETES MELLITUS WITH STAGE 4 CHRONIC KIDNEY DISEASE, WITH LONG-TERM CURRENT USE OF INSULIN (HCC): ICD-10-CM

## 2025-02-27 DIAGNOSIS — E03.9 ACQUIRED HYPOTHYROIDISM: ICD-10-CM

## 2025-02-27 DIAGNOSIS — Z13.21 ENCOUNTER FOR VITAMIN DEFICIENCY SCREENING: ICD-10-CM

## 2025-02-27 DIAGNOSIS — E66.01 CLASS 2 SEVERE OBESITY WITH SERIOUS COMORBIDITY AND BODY MASS INDEX (BMI) OF 35.0 TO 35.9 IN ADULT, UNSPECIFIED OBESITY TYPE: ICD-10-CM

## 2025-02-27 DIAGNOSIS — E66.812 CLASS 2 SEVERE OBESITY WITH SERIOUS COMORBIDITY AND BODY MASS INDEX (BMI) OF 35.0 TO 35.9 IN ADULT, UNSPECIFIED OBESITY TYPE: ICD-10-CM

## 2025-02-27 DIAGNOSIS — F32.A DEPRESSION, UNSPECIFIED DEPRESSION TYPE: ICD-10-CM

## 2025-02-27 DIAGNOSIS — E11.22 TYPE 2 DIABETES MELLITUS WITH STAGE 4 CHRONIC KIDNEY DISEASE, WITH LONG-TERM CURRENT USE OF INSULIN (HCC): ICD-10-CM

## 2025-02-27 DIAGNOSIS — D50.9 CHRONIC IRON DEFICIENCY ANEMIA: ICD-10-CM

## 2025-02-27 RX ORDER — METOLAZONE 5 MG/1
5 TABLET ORAL WEEKLY
COMMUNITY

## 2025-02-27 RX ORDER — LISINOPRIL 30 MG/1
30 TABLET ORAL DAILY
COMMUNITY

## 2025-02-27 RX ORDER — HYDROCHLOROTHIAZIDE 12.5 MG/1
12.5 CAPSULE ORAL DAILY
COMMUNITY

## 2025-02-27 RX ORDER — MIRTAZAPINE 15 MG/1
15 TABLET, FILM COATED ORAL NIGHTLY
Qty: 30 TABLET | Refills: 3 | Status: SHIPPED | OUTPATIENT
Start: 2025-02-27

## 2025-02-27 SDOH — ECONOMIC STABILITY: FOOD INSECURITY: WITHIN THE PAST 12 MONTHS, THE FOOD YOU BOUGHT JUST DIDN'T LAST AND YOU DIDN'T HAVE MONEY TO GET MORE.: NEVER TRUE

## 2025-02-27 SDOH — ECONOMIC STABILITY: FOOD INSECURITY: WITHIN THE PAST 12 MONTHS, YOU WORRIED THAT YOUR FOOD WOULD RUN OUT BEFORE YOU GOT MONEY TO BUY MORE.: NEVER TRUE

## 2025-02-27 ASSESSMENT — PATIENT HEALTH QUESTIONNAIRE - PHQ9
2. FEELING DOWN, DEPRESSED OR HOPELESS: NEARLY EVERY DAY
SUM OF ALL RESPONSES TO PHQ QUESTIONS 1-9: 18
SUM OF ALL RESPONSES TO PHQ QUESTIONS 1-9: 18
8. MOVING OR SPEAKING SO SLOWLY THAT OTHER PEOPLE COULD HAVE NOTICED. OR THE OPPOSITE, BEING SO FIGETY OR RESTLESS THAT YOU HAVE BEEN MOVING AROUND A LOT MORE THAN USUAL: MORE THAN HALF THE DAYS
3. TROUBLE FALLING OR STAYING ASLEEP: NOT AT ALL
SUM OF ALL RESPONSES TO PHQ QUESTIONS 1-9: 18
10. IF YOU CHECKED OFF ANY PROBLEMS, HOW DIFFICULT HAVE THESE PROBLEMS MADE IT FOR YOU TO DO YOUR WORK, TAKE CARE OF THINGS AT HOME, OR GET ALONG WITH OTHER PEOPLE: VERY DIFFICULT
1. LITTLE INTEREST OR PLEASURE IN DOING THINGS: NEARLY EVERY DAY
7. TROUBLE CONCENTRATING ON THINGS, SUCH AS READING THE NEWSPAPER OR WATCHING TELEVISION: NEARLY EVERY DAY
6. FEELING BAD ABOUT YOURSELF - OR THAT YOU ARE A FAILURE OR HAVE LET YOURSELF OR YOUR FAMILY DOWN: NEARLY EVERY DAY
5. POOR APPETITE OR OVEREATING: MORE THAN HALF THE DAYS
SUM OF ALL RESPONSES TO PHQ QUESTIONS 1-9: 18
9. THOUGHTS THAT YOU WOULD BE BETTER OFF DEAD, OR OF HURTING YOURSELF: NOT AT ALL
4. FEELING TIRED OR HAVING LITTLE ENERGY: MORE THAN HALF THE DAYS
SUM OF ALL RESPONSES TO PHQ9 QUESTIONS 1 & 2: 6

## 2025-02-27 NOTE — PROGRESS NOTES
25  Aria Sim : 1955 Sex: female  Age: 69 y.o.    Chief Complaint   Patient presents with    Physical Therapy Discharge     Discharge from [facility]. Dec 25 started and released a week ago . Fluid retention in the arm and a \"sore\" on the right arm.     Depression     Seems to be getting worse    Swelling     Arm and leg swelling.       History of Present Illness  The patient is a 69-year-old female who presents for evaluation of chronic daily headaches, itchy eyes, hearing loss, sore throat, dry cough, diarrhea, edema, anemia, and hyperkalemia.    She has been residing at [facility] in [place], [place], since 2024, following a hospital admission due to a stroke. She was hospitalized for approximately 2 weeks before being transferred to the nursing home. She has experienced 2 falls at home since her discharge from the [facility] home, one of which resulted in a skin tear on her right forearm. Both falls required assistance from her son-in-law to help her off the floor. She receives physical therapy twice a week and has nurse's aides visit her home 5 days a week for 3 hours each day. Her  works daily, leaving her alone for short periods. She primarily uses a wheelchair for mobility but can use a walker with difficulty due to weakness. She requires assistance for bathroom visits and has had accidents when attempting to go unaided.    She has been under evaluation for edema, particularly in her right upper extremity, and is currently on diuretics. She consulted with her cardiologist, Dr. Leigh, yesterday, who prescribed metolazone 5 mg weekly. She is on metolazone 5 mg weekly.    She experiences chronic daily headaches, described as similar to a toothache, without any sharp or shooting pain.    She reports itchy eyes but no redness or burning sensation. She has hearing loss but no ear pain or discharge.    She has had a sore throat, which she attributes to ongoing

## 2025-02-27 NOTE — ASSESSMENT & PLAN NOTE
Chronic, at goal (stable), continue current plan pending work up below    Orders:    Vitamin B12; Future

## 2025-02-27 NOTE — ASSESSMENT & PLAN NOTE
Chronic, at goal (stable), continue current plan pending work up below    Orders:    Comprehensive Metabolic Panel, Fasting; Future    Hemoglobin A1C; Future

## 2025-02-27 NOTE — ASSESSMENT & PLAN NOTE
Chronic, at goal (stable), continue current plan pending work up below    Orders:    CBC with Auto Differential; Future    Iron; Future

## 2025-02-27 NOTE — ASSESSMENT & PLAN NOTE
Seen in conjunction with the department of cardiology and was placed on metolazone weeklyAnd she does maintain the benefit of Bumex daily.    Orders:    CBC with Auto Differential; Future    Folate; Future    Brain Natriuretic Peptide; Future

## 2025-03-17 ENCOUNTER — TELEPHONE (OUTPATIENT)
Dept: PRIMARY CARE CLINIC | Age: 70
End: 2025-03-17

## 2025-03-17 NOTE — TELEPHONE ENCOUNTER
Patient called in and said she wants an antibiotic called in bc she has had a cold for over a week and it 'just won't let go'. I advised that she would need to be seen and evaluated on walk in. She said, 'I'm telling you right now, I don't have the strength to come in'. I advised especially if she was weak, she should be seen at either walk in or the ED for an evaluation. She hung up on me.

## 2025-03-25 ENCOUNTER — OFFICE VISIT (OUTPATIENT)
Dept: PRIMARY CARE CLINIC | Age: 70
End: 2025-03-25
Payer: MEDICARE

## 2025-03-25 VITALS
HEART RATE: 73 BPM | SYSTOLIC BLOOD PRESSURE: 138 MMHG | DIASTOLIC BLOOD PRESSURE: 88 MMHG | WEIGHT: 184 LBS | TEMPERATURE: 97 F | HEIGHT: 61 IN | OXYGEN SATURATION: 94 % | BODY MASS INDEX: 34.74 KG/M2 | RESPIRATION RATE: 18 BRPM

## 2025-03-25 DIAGNOSIS — I50.20 HFREF (HEART FAILURE WITH REDUCED EJECTION FRACTION) (HCC): ICD-10-CM

## 2025-03-25 DIAGNOSIS — S61.401A AVULSION OF SKIN OF RIGHT HAND, INITIAL ENCOUNTER: ICD-10-CM

## 2025-03-25 DIAGNOSIS — I10 ESSENTIAL HYPERTENSION: ICD-10-CM

## 2025-03-25 DIAGNOSIS — N18.4 TYPE 2 DIABETES MELLITUS WITH STAGE 4 CHRONIC KIDNEY DISEASE, WITH LONG-TERM CURRENT USE OF INSULIN (HCC): Primary | ICD-10-CM

## 2025-03-25 DIAGNOSIS — R29.6 RECURRENT FALLS: ICD-10-CM

## 2025-03-25 DIAGNOSIS — I25.10 CORONARY ARTERY DISEASE INVOLVING NATIVE CORONARY ARTERY OF NATIVE HEART WITHOUT ANGINA PECTORIS: ICD-10-CM

## 2025-03-25 DIAGNOSIS — E83.51 HYPOCALCEMIA: ICD-10-CM

## 2025-03-25 DIAGNOSIS — M1A.3791 CHRONIC GOUT DUE TO RENAL IMPAIRMENT INVOLVING FOOT WITH TOPHUS, UNSPECIFIED LATERALITY: ICD-10-CM

## 2025-03-25 DIAGNOSIS — F51.04 PSYCHOPHYSIOLOGICAL INSOMNIA: ICD-10-CM

## 2025-03-25 DIAGNOSIS — F43.22 ADJUSTMENT REACTION WITH ANXIETY: ICD-10-CM

## 2025-03-25 DIAGNOSIS — E03.9 ACQUIRED HYPOTHYROIDISM: ICD-10-CM

## 2025-03-25 DIAGNOSIS — Z79.4 TYPE 2 DIABETES MELLITUS WITH STAGE 4 CHRONIC KIDNEY DISEASE, WITH LONG-TERM CURRENT USE OF INSULIN (HCC): Primary | ICD-10-CM

## 2025-03-25 DIAGNOSIS — E11.22 TYPE 2 DIABETES MELLITUS WITH STAGE 4 CHRONIC KIDNEY DISEASE, WITH LONG-TERM CURRENT USE OF INSULIN (HCC): Primary | ICD-10-CM

## 2025-03-25 PROCEDURE — 3017F COLORECTAL CA SCREEN DOC REV: CPT | Performed by: STUDENT IN AN ORGANIZED HEALTH CARE EDUCATION/TRAINING PROGRAM

## 2025-03-25 PROCEDURE — 1123F ACP DISCUSS/DSCN MKR DOCD: CPT | Performed by: STUDENT IN AN ORGANIZED HEALTH CARE EDUCATION/TRAINING PROGRAM

## 2025-03-25 PROCEDURE — 3079F DIAST BP 80-89 MM HG: CPT | Performed by: STUDENT IN AN ORGANIZED HEALTH CARE EDUCATION/TRAINING PROGRAM

## 2025-03-25 PROCEDURE — 2022F DILAT RTA XM EVC RTNOPTHY: CPT | Performed by: STUDENT IN AN ORGANIZED HEALTH CARE EDUCATION/TRAINING PROGRAM

## 2025-03-25 PROCEDURE — 3046F HEMOGLOBIN A1C LEVEL >9.0%: CPT | Performed by: STUDENT IN AN ORGANIZED HEALTH CARE EDUCATION/TRAINING PROGRAM

## 2025-03-25 PROCEDURE — G8400 PT W/DXA NO RESULTS DOC: HCPCS | Performed by: STUDENT IN AN ORGANIZED HEALTH CARE EDUCATION/TRAINING PROGRAM

## 2025-03-25 PROCEDURE — 1090F PRES/ABSN URINE INCON ASSESS: CPT | Performed by: STUDENT IN AN ORGANIZED HEALTH CARE EDUCATION/TRAINING PROGRAM

## 2025-03-25 PROCEDURE — 99214 OFFICE O/P EST MOD 30 MIN: CPT | Performed by: STUDENT IN AN ORGANIZED HEALTH CARE EDUCATION/TRAINING PROGRAM

## 2025-03-25 PROCEDURE — G8427 DOCREV CUR MEDS BY ELIG CLIN: HCPCS | Performed by: STUDENT IN AN ORGANIZED HEALTH CARE EDUCATION/TRAINING PROGRAM

## 2025-03-25 PROCEDURE — G8417 CALC BMI ABV UP PARAM F/U: HCPCS | Performed by: STUDENT IN AN ORGANIZED HEALTH CARE EDUCATION/TRAINING PROGRAM

## 2025-03-25 PROCEDURE — G2211 COMPLEX E/M VISIT ADD ON: HCPCS | Performed by: STUDENT IN AN ORGANIZED HEALTH CARE EDUCATION/TRAINING PROGRAM

## 2025-03-25 PROCEDURE — 3075F SYST BP GE 130 - 139MM HG: CPT | Performed by: STUDENT IN AN ORGANIZED HEALTH CARE EDUCATION/TRAINING PROGRAM

## 2025-03-25 PROCEDURE — 1036F TOBACCO NON-USER: CPT | Performed by: STUDENT IN AN ORGANIZED HEALTH CARE EDUCATION/TRAINING PROGRAM

## 2025-03-25 RX ORDER — PRIMIDONE 50 MG/1
50 TABLET ORAL NIGHTLY
COMMUNITY

## 2025-03-25 RX ORDER — HYDROXYZINE HYDROCHLORIDE 25 MG/1
TABLET, FILM COATED ORAL
COMMUNITY
Start: 2025-01-15 | End: 2025-04-01

## 2025-03-25 RX ORDER — HYDROCHLOROTHIAZIDE 25 MG/1
TABLET ORAL
COMMUNITY
Start: 2025-01-16 | End: 2025-04-01

## 2025-03-25 RX ORDER — PANTOPRAZOLE SODIUM 40 MG/1
40 TABLET, DELAYED RELEASE ORAL
COMMUNITY
Start: 2024-12-25 | End: 2025-04-01 | Stop reason: ALTCHOICE

## 2025-03-25 RX ORDER — MELOXICAM 15 MG/1
15 TABLET ORAL DAILY
COMMUNITY
End: 2025-04-01

## 2025-03-25 RX ORDER — HYDRALAZINE HYDROCHLORIDE 100 MG/1
TABLET, FILM COATED ORAL
COMMUNITY
Start: 2025-02-13 | End: 2025-04-01 | Stop reason: SDUPTHER

## 2025-03-25 RX ORDER — RANOLAZINE 500 MG/1
500 TABLET, EXTENDED RELEASE ORAL 2 TIMES DAILY
COMMUNITY
End: 2025-03-25

## 2025-03-25 RX ORDER — MUPIROCIN 20 MG/G
OINTMENT TOPICAL
Qty: 30 G | Refills: 0 | Status: SHIPPED | OUTPATIENT
Start: 2025-03-25 | End: 2025-04-01

## 2025-03-25 RX ORDER — LEVOTHYROXINE SODIUM 112 UG/1
TABLET ORAL
COMMUNITY
Start: 2025-02-13 | End: 2025-04-01 | Stop reason: ALTCHOICE

## 2025-03-25 RX ORDER — HYDROCHLOROTHIAZIDE 12.5 MG/1
12.5 TABLET ORAL DAILY
COMMUNITY
Start: 2025-01-31 | End: 2025-04-01

## 2025-03-25 RX ORDER — ASPIRIN 81 MG/1
TABLET, COATED ORAL
COMMUNITY
Start: 2025-02-13

## 2025-03-25 RX ORDER — LISINOPRIL 20 MG/1
TABLET ORAL
COMMUNITY
Start: 2025-01-15 | End: 2025-04-01 | Stop reason: DRUGHIGH

## 2025-03-25 RX ORDER — BUMETANIDE 1 MG/1
TABLET ORAL
COMMUNITY
Start: 2025-02-15 | End: 2025-04-01

## 2025-03-25 RX ORDER — LISINOPRIL 10 MG/1
10 TABLET ORAL DAILY
COMMUNITY
Start: 2025-01-31

## 2025-03-25 RX ORDER — TIMOLOL MALEATE 5 MG/ML
SOLUTION/ DROPS OPHTHALMIC
COMMUNITY
Start: 2025-02-13

## 2025-03-25 NOTE — PROGRESS NOTES
ESTABLISHED PRIMARY CARE VISIT    3/25/25  Name: Aria Sim   : 1955   Age: 69 y.o.  Sex: female        Assessment & Plan:     Problem List Items Addressed This Visit       Psychophysiological insomnia    Chronic, controlled  Continue trazodone 50 mg nightly         Essential hypertension    Chronic, controlled  Continue amlodipine 10 mg daily, Bumex 2 mg twice daily, carvedilol 25 mg twice daily, HCTZ 12.5 mg daily, hydralazine 100 mg 3 times daily, Imdur XR 60 mg daily, lisinopril 10 mg daily, metolazone 2.5 mg daily         Relevant Medications    lisinopril (PRINIVIL;ZESTRIL) 10 MG tablet    ASPIRIN LOW DOSE 81 MG EC tablet    amLODIPine (NORVASC) 10 MG tablet    carvedilol (COREG) 25 MG tablet    Type 2 diabetes mellitus with stage 4 chronic kidney disease, with long-term current use of insulin ([facility]) - Primary    Chronic, controlled with A1c 7.1  Follows with endocrinology  Continue Semglee 50 units daily, Humalog 16 units with meals  Continue gabapentin 100 mg twice daily for neuropathy         Relevant Medications    lisinopril (PRINIVIL;ZESTRIL) 10 MG tablet    ASPIRIN LOW DOSE 81 MG EC tablet    Other Relevant Orders    Hemoglobin A1C    Acquired hypothyroidism    Chronic, controlled  Continue levothyroxine 88 mcg daily         Relevant Medications    levothyroxine (SYNTHROID) 88 MCG tablet    Other Relevant Orders    TSH    HFrEF (heart failure with reduced ejection fraction) ([facility])    Chronic, stable, euvolemic  Follows with cardio  Continue Bumex 2 mg twice daily, carvedilol 25 mg twice daily, HCTZ 12.5 mg daily, hydralazine 100 mg 3 times daily, Imdur XR 60 mg daily, lisinopril 10 mg daily, metolazone 2.5 mg daily         Relevant Medications    lisinopril (PRINIVIL;ZESTRIL) 10 MG tablet    ASPIRIN LOW DOSE 81 MG EC tablet    amLODIPine (NORVASC) 10 MG tablet    carvedilol (COREG) 25 MG tablet    Coronary artery disease involving native coronary artery of native heart without angina

## 2025-03-27 DIAGNOSIS — I25.10 CORONARY ARTERY DISEASE INVOLVING NATIVE CORONARY ARTERY OF NATIVE HEART WITHOUT ANGINA PECTORIS: ICD-10-CM

## 2025-03-27 DIAGNOSIS — I10 ESSENTIAL HYPERTENSION: ICD-10-CM

## 2025-03-27 DIAGNOSIS — M1A.3791 CHRONIC GOUT DUE TO RENAL IMPAIRMENT INVOLVING FOOT WITH TOPHUS, UNSPECIFIED LATERALITY: ICD-10-CM

## 2025-03-27 DIAGNOSIS — I50.20 HFREF (HEART FAILURE WITH REDUCED EJECTION FRACTION) (HCC): ICD-10-CM

## 2025-03-27 DIAGNOSIS — E03.9 ACQUIRED HYPOTHYROIDISM: ICD-10-CM

## 2025-03-27 NOTE — TELEPHONE ENCOUNTER
Name of Medication(s) Requested:  Requested Prescriptions     Pending Prescriptions Disp Refills    carvedilol (COREG) 25 MG tablet [Pharmacy Med Name: CARVEDILOL 25MG TABLETS] 180 tablet 1     Sig: TAKE 1 TABLET BY MOUTH TWICE DAILY WITH MEALS    febuxostat (ULORIC) 40 MG TABS tablet [Pharmacy Med Name: FEBUXOSTAT 40MG TABLETS] 90 tablet 1     Sig: TAKE 1 TABLET BY MOUTH DAILY    levothyroxine (SYNTHROID) 88 MCG tablet [Pharmacy Med Name: LEVOTHYROXINE 0.088MG (88MCG) TAB] 90 tablet 1     Sig: TAKE 1 TABLET BY MOUTH DAILY    amLODIPine (NORVASC) 10 MG tablet [Pharmacy Med Name: AMLODIPINE BESYLATE 10MG TABLETS] 90 tablet 1     Sig: TAKE 1 TABLET BY MOUTH DAILY       Medication is on current medication list Yes    Dosage and directions were verified? Yes    Quantity verified: 90 day supply     Pharmacy Verified?  Yes    Last Appointment:  3/25/2025    Future appts:  Future Appointments   Date Time Provider Department Center   5/29/2025  2:00 PM Alfonzo Ring MD [facility] DEP        (If no appt send self scheduling link. .REFILLAPPT)  Scheduling request sent?     [] Yes  [x] No    Does patient need updated?  [] Yes  [x] No

## 2025-03-31 DIAGNOSIS — E03.9 ACQUIRED HYPOTHYROIDISM: ICD-10-CM

## 2025-03-31 DIAGNOSIS — I50.20 HFREF (HEART FAILURE WITH REDUCED EJECTION FRACTION) (HCC): ICD-10-CM

## 2025-03-31 DIAGNOSIS — I25.10 CORONARY ARTERY DISEASE INVOLVING NATIVE CORONARY ARTERY OF NATIVE HEART WITHOUT ANGINA PECTORIS: ICD-10-CM

## 2025-03-31 DIAGNOSIS — I10 ESSENTIAL HYPERTENSION: ICD-10-CM

## 2025-03-31 DIAGNOSIS — M1A.3791 CHRONIC GOUT DUE TO RENAL IMPAIRMENT INVOLVING FOOT WITH TOPHUS, UNSPECIFIED LATERALITY: ICD-10-CM

## 2025-03-31 RX ORDER — LEVOTHYROXINE SODIUM 88 UG/1
88 TABLET ORAL DAILY
Qty: 90 TABLET | Refills: 1 | OUTPATIENT
Start: 2025-03-31

## 2025-03-31 RX ORDER — CARVEDILOL 25 MG/1
25 TABLET ORAL 2 TIMES DAILY WITH MEALS
Qty: 180 TABLET | Refills: 1 | OUTPATIENT
Start: 2025-03-31

## 2025-03-31 RX ORDER — FEBUXOSTAT 40 MG/1
40 TABLET, FILM COATED ORAL DAILY
Qty: 90 TABLET | Refills: 1 | OUTPATIENT
Start: 2025-03-31

## 2025-03-31 RX ORDER — AMLODIPINE BESYLATE 10 MG/1
10 TABLET ORAL DAILY
Qty: 90 TABLET | Refills: 1 | OUTPATIENT
Start: 2025-03-31

## 2025-03-31 NOTE — TELEPHONE ENCOUNTER
Name of Medication(s) Requested:  Requested Prescriptions     Pending Prescriptions Disp Refills    levothyroxine (SYNTHROID) 88 MCG tablet 90 tablet 1     Sig: Take 1 tablet by mouth Daily       Medication is on current medication list Yes    Dosage and directions were verified? Yes    Quantity verified: 90 day supply     Pharmacy Verified?  Yes    Last Appointment:  3/25/2025    Future appts:  Future Appointments   Date Time Provider Department Center   5/29/2025  2:00 PM Alfonzo Ring MD [facility] DEP        (If no appt send self scheduling link. .REFILLAPPT)  Scheduling request sent?     [] Yes  [x] No    Does patient need updated?  [] Yes  [x] No   Yes

## 2025-04-01 RX ORDER — AMLODIPINE BESYLATE 10 MG/1
10 TABLET ORAL DAILY
Qty: 90 TABLET | Refills: 1 | OUTPATIENT
Start: 2025-04-01

## 2025-04-01 RX ORDER — AMLODIPINE BESYLATE 10 MG/1
10 TABLET ORAL DAILY
Qty: 90 TABLET | Refills: 1 | Status: SHIPPED | OUTPATIENT
Start: 2025-04-01

## 2025-04-01 RX ORDER — FEBUXOSTAT 40 MG/1
40 TABLET, FILM COATED ORAL DAILY
Qty: 90 TABLET | Refills: 1 | Status: SHIPPED | OUTPATIENT
Start: 2025-04-01

## 2025-04-01 RX ORDER — LEVOTHYROXINE SODIUM 88 UG/1
88 TABLET ORAL DAILY
Qty: 90 TABLET | Refills: 1 | Status: SHIPPED | OUTPATIENT
Start: 2025-04-01

## 2025-04-01 RX ORDER — CARVEDILOL 25 MG/1
25 TABLET ORAL 2 TIMES DAILY WITH MEALS
Qty: 180 TABLET | Refills: 1 | OUTPATIENT
Start: 2025-04-01

## 2025-04-01 RX ORDER — FEBUXOSTAT 40 MG/1
40 TABLET, FILM COATED ORAL DAILY
Qty: 90 TABLET | Refills: 1 | OUTPATIENT
Start: 2025-04-01

## 2025-04-01 RX ORDER — LEVOTHYROXINE SODIUM 88 UG/1
88 TABLET ORAL DAILY
Qty: 90 TABLET | Refills: 1 | OUTPATIENT
Start: 2025-04-01

## 2025-04-01 RX ORDER — CARVEDILOL 25 MG/1
25 TABLET ORAL 2 TIMES DAILY WITH MEALS
Qty: 180 TABLET | Refills: 1 | Status: SHIPPED | OUTPATIENT
Start: 2025-04-01

## 2025-04-01 ASSESSMENT — ENCOUNTER SYMPTOMS
CONSTIPATION: 0
BLOOD IN STOOL: 0
ABDOMINAL DISTENTION: 0
SHORTNESS OF BREATH: 0
COUGH: 0
ABDOMINAL PAIN: 0
DIARRHEA: 0
VOMITING: 0
NAUSEA: 1

## 2025-04-01 NOTE — ASSESSMENT & PLAN NOTE
Chronic, controlled  Continue amlodipine 10 mg daily, Bumex 2 mg twice daily, carvedilol 25 mg twice daily, HCTZ 12.5 mg daily, hydralazine 100 mg 3 times daily, Imdur XR 60 mg daily, lisinopril 10 mg daily, metolazone 2.5 mg daily

## 2025-04-01 NOTE — ASSESSMENT & PLAN NOTE
Chronic, stable, euvolemic  Follows with cardio  Continue Bumex 2 mg twice daily, carvedilol 25 mg twice daily, HCTZ 12.5 mg daily, hydralazine 100 mg 3 times daily, Imdur XR 60 mg daily, metolazone 2.5 mg daily

## 2025-04-01 NOTE — ASSESSMENT & PLAN NOTE
Chronic, stable, asymptomatic  Follows with cardio  Continue aspirin 81 mg daily, atorvastatin 40 mg daily, carvedilol 25 mg twice daily, Imdur XR 60 mg daily, lisinopril 10 mg daily, Ranexa 500 mg twice daily

## 2025-04-21 ENCOUNTER — TELEPHONE (OUTPATIENT)
Dept: PRIMARY CARE CLINIC | Age: 70
End: 2025-04-21

## 2025-05-15 ENCOUNTER — TELEPHONE (OUTPATIENT)
Dept: PRIMARY CARE CLINIC | Age: 70
End: 2025-05-15

## 2025-05-15 ENCOUNTER — OFFICE VISIT (OUTPATIENT)
Dept: PRIMARY CARE CLINIC | Age: 70
End: 2025-05-15
Payer: COMMERCIAL

## 2025-05-15 VITALS
BODY MASS INDEX: 32.47 KG/M2 | HEIGHT: 61 IN | TEMPERATURE: 97.5 F | SYSTOLIC BLOOD PRESSURE: 124 MMHG | WEIGHT: 172 LBS | HEART RATE: 76 BPM | DIASTOLIC BLOOD PRESSURE: 68 MMHG | OXYGEN SATURATION: 99 %

## 2025-05-15 DIAGNOSIS — J44.9 CHRONIC OBSTRUCTIVE PULMONARY DISEASE, UNSPECIFIED COPD TYPE (HCC): ICD-10-CM

## 2025-05-15 DIAGNOSIS — D50.9 CHRONIC IRON DEFICIENCY ANEMIA: Primary | ICD-10-CM

## 2025-05-15 DIAGNOSIS — N18.4 TYPE 2 DIABETES MELLITUS WITH STAGE 4 CHRONIC KIDNEY DISEASE, WITH LONG-TERM CURRENT USE OF INSULIN (HCC): ICD-10-CM

## 2025-05-15 DIAGNOSIS — E83.51 HYPOCALCEMIA: ICD-10-CM

## 2025-05-15 DIAGNOSIS — E03.9 ACQUIRED HYPOTHYROIDISM: ICD-10-CM

## 2025-05-15 DIAGNOSIS — I50.20 HFREF (HEART FAILURE WITH REDUCED EJECTION FRACTION) (HCC): ICD-10-CM

## 2025-05-15 DIAGNOSIS — N39.46 MIXED STRESS AND URGE URINARY INCONTINENCE: ICD-10-CM

## 2025-05-15 DIAGNOSIS — F43.22 ADJUSTMENT REACTION WITH ANXIETY: ICD-10-CM

## 2025-05-15 DIAGNOSIS — R19.7 DIARRHEA, UNSPECIFIED TYPE: ICD-10-CM

## 2025-05-15 DIAGNOSIS — E11.22 TYPE 2 DIABETES MELLITUS WITH STAGE 4 CHRONIC KIDNEY DISEASE, WITH LONG-TERM CURRENT USE OF INSULIN (HCC): ICD-10-CM

## 2025-05-15 DIAGNOSIS — N18.4 STAGE 4 CHRONIC KIDNEY DISEASE (HCC): ICD-10-CM

## 2025-05-15 DIAGNOSIS — Z79.4 TYPE 2 DIABETES MELLITUS WITH STAGE 4 CHRONIC KIDNEY DISEASE, WITH LONG-TERM CURRENT USE OF INSULIN (HCC): ICD-10-CM

## 2025-05-15 DIAGNOSIS — M19.90 ARTHRITIS: ICD-10-CM

## 2025-05-15 DIAGNOSIS — J96.11 CHRONIC RESPIRATORY FAILURE WITH HYPOXIA (HCC): Primary | ICD-10-CM

## 2025-05-15 PROCEDURE — 3074F SYST BP LT 130 MM HG: CPT | Performed by: STUDENT IN AN ORGANIZED HEALTH CARE EDUCATION/TRAINING PROGRAM

## 2025-05-15 PROCEDURE — 1123F ACP DISCUSS/DSCN MKR DOCD: CPT | Performed by: STUDENT IN AN ORGANIZED HEALTH CARE EDUCATION/TRAINING PROGRAM

## 2025-05-15 PROCEDURE — 3017F COLORECTAL CA SCREEN DOC REV: CPT | Performed by: STUDENT IN AN ORGANIZED HEALTH CARE EDUCATION/TRAINING PROGRAM

## 2025-05-15 PROCEDURE — 1036F TOBACCO NON-USER: CPT | Performed by: STUDENT IN AN ORGANIZED HEALTH CARE EDUCATION/TRAINING PROGRAM

## 2025-05-15 PROCEDURE — 1090F PRES/ABSN URINE INCON ASSESS: CPT | Performed by: STUDENT IN AN ORGANIZED HEALTH CARE EDUCATION/TRAINING PROGRAM

## 2025-05-15 PROCEDURE — G2211 COMPLEX E/M VISIT ADD ON: HCPCS | Performed by: STUDENT IN AN ORGANIZED HEALTH CARE EDUCATION/TRAINING PROGRAM

## 2025-05-15 PROCEDURE — 3023F SPIROM DOC REV: CPT | Performed by: STUDENT IN AN ORGANIZED HEALTH CARE EDUCATION/TRAINING PROGRAM

## 2025-05-15 PROCEDURE — G8400 PT W/DXA NO RESULTS DOC: HCPCS | Performed by: STUDENT IN AN ORGANIZED HEALTH CARE EDUCATION/TRAINING PROGRAM

## 2025-05-15 PROCEDURE — 3078F DIAST BP <80 MM HG: CPT | Performed by: STUDENT IN AN ORGANIZED HEALTH CARE EDUCATION/TRAINING PROGRAM

## 2025-05-15 PROCEDURE — 3044F HG A1C LEVEL LT 7.0%: CPT | Performed by: STUDENT IN AN ORGANIZED HEALTH CARE EDUCATION/TRAINING PROGRAM

## 2025-05-15 PROCEDURE — 99214 OFFICE O/P EST MOD 30 MIN: CPT | Performed by: STUDENT IN AN ORGANIZED HEALTH CARE EDUCATION/TRAINING PROGRAM

## 2025-05-15 PROCEDURE — 0509F URINE INCON PLAN DOCD: CPT | Performed by: STUDENT IN AN ORGANIZED HEALTH CARE EDUCATION/TRAINING PROGRAM

## 2025-05-15 PROCEDURE — G8427 DOCREV CUR MEDS BY ELIG CLIN: HCPCS | Performed by: STUDENT IN AN ORGANIZED HEALTH CARE EDUCATION/TRAINING PROGRAM

## 2025-05-15 PROCEDURE — 2022F DILAT RTA XM EVC RTNOPTHY: CPT | Performed by: STUDENT IN AN ORGANIZED HEALTH CARE EDUCATION/TRAINING PROGRAM

## 2025-05-15 PROCEDURE — G8417 CALC BMI ABV UP PARAM F/U: HCPCS | Performed by: STUDENT IN AN ORGANIZED HEALTH CARE EDUCATION/TRAINING PROGRAM

## 2025-05-15 RX ORDER — MIRTAZAPINE 15 MG/1
15 TABLET, FILM COATED ORAL NIGHTLY
COMMUNITY
Start: 2025-04-30

## 2025-05-15 RX ORDER — BENZONATATE 100 MG/1
200 CAPSULE ORAL 3 TIMES DAILY PRN
COMMUNITY

## 2025-05-15 RX ORDER — BUSPIRONE HYDROCHLORIDE 15 MG/1
15 TABLET ORAL 3 TIMES DAILY
Qty: 270 TABLET | Refills: 0 | Status: SHIPPED | OUTPATIENT
Start: 2025-05-15

## 2025-05-15 NOTE — TELEPHONE ENCOUNTER
----- Message from Maria G WALSH sent at 5/15/2025  1:17 PM EDT -----    ----- Message -----  From: Alfonzo Ring MD  Sent: 5/15/2025   1:06 PM EDT  To: [facility]  Staff    Patient reports skilled nurse and aid coming out but has not been seen by PT or OT and I have not received any notes/new orders from PT or OT either. Can we check on this with [facility]?

## 2025-05-15 NOTE — PROGRESS NOTES
ESTABLISHED PRIMARY CARE VISIT    5/15/25  Name: Aria Sim   : 1955   Age: 70 y.o.  Sex: female        Assessment & Plan:     Problem List Items Addressed This Visit       Acquired hypothyroidism    Chronic, controlled  Continue levothyroxine 88 mcg daily         Relevant Orders    TSH    Chronic iron deficiency anemia - Primary    Acute on chronic requiring transfusion last week  Recheck  Check FIT to rule out GI losses, continue omeprazole 40 mg daily  May be related to cardiorenal disease  Following with hematology, nephrology  Consider EPO  Continue iron, PRN transfusions         Relevant Orders    CBC    Iron and TIBC    Ferritin    POCT Fecal Immunochemical Test (FIT)    Chronic obstructive lung disease (HCC)    Chronic, controlled  Continue DuoNebs as needed         Relevant Medications    benzonatate (TESSALON) 100 MG capsule    [facility]    HFrEF (heart failure with reduced ejection fraction) (HCC)    Chronic, stable, euvolemic  Follows with cardio  Continue Bumex 2 mg twice daily, carvedilol 25 mg twice daily, hydralazine 100 mg 3 times daily, Imdur XR 60 mg daily, lisinopril 10 mg daily, metolazone 2.5 mg daily         Relevant Medications    Handicap Placard MISC    Stage 4 chronic kidney disease (HCC)    Chronic, stable  Recheck  Follows with nephro  Would consider peritoneal dialysis in future if needed         Relevant Orders    Comprehensive Metabolic Panel    Type 2 diabetes mellitus with stage 4 chronic kidney disease, with long-term current use of insulin (HCC)    Chronic, controlled with A1c 7.1  Follows with endocrinology  Continue Semglee 50 units daily, Humalog 16 units with meals  Continue gabapentin 100 mg twice daily for neuropathy         Relevant Orders    Hemoglobin A1C    Comprehensive Metabolic Panel    Adjustment reaction with anxiety    Chronic, worsened due to recurrent stressors, currently dog is dying  Continue Cymbalta 60 mg daily  Increase Buspar 15

## 2025-05-15 NOTE — TELEPHONE ENCOUNTER
Spoke with Karen/Belen? From [facility], confirmed would need to speak with Any/clinical supervisor regarding doctors message, confirmed any is out of office today but would have her call tomorrow. Awaiting call back.

## 2025-05-16 LAB
A/G RATIO: 0.6 RATIO (ref 1.1–1.8)
ALBUMIN: 2.2 G/DL (ref 3.4–5)
ALK PHOSPHATASE: 73 U/L (ref 45–117)
ALT SERPL-CCNC: 7 U/L (ref 12–78)
AST SERPL-CCNC: 6 U/L (ref 15–37)
BASOPHILS ABSOLUTE: 0 10 3/MCL (ref 0–0.3)
BASOPHILS RELATIVE PERCENT: 0.8 % (ref 0–2.5)
BILIRUB SERPL-MCNC: 0.2 MG/DL (ref 0.2–1)
BUN / CREAT RATIO: 18.3 RATIO (ref 10–22)
BUN BLDV-MCNC: 79 MG/DL (ref 7–18)
CALCIUM IONIZED: 1.06 MMOL/L (ref 1.12–1.32)
CALCIUM SERPL-MCNC: 8.2 MG/DL (ref 8.5–10.1)
CHLORIDE BLD-SCNC: 112 MMOL/L (ref 98–107)
CO2: 21 MMOL/L (ref 21–32)
CREAT SERPL-MCNC: 4.32 MG/DL (ref 0.51–0.95)
ELECTROLYTE BALANCE: 6 MEQ/L (ref 4–15)
EOSINOPHILS ABSOLUTE: 0.1 10 3/MCL (ref 0–0.7)
EOSINOPHILS RELATIVE PERCENT: 3.1 % (ref 0–6)
ESTIMATED AVERAGE GLUCOSE: 140 MG/DL
FERRITIN: 44.2 CD:36150178101 (ref 8–252)
GFR, ESTIMATED: 10 ML/MIN/1.73SQM
GLOBULIN: 3.7 G/DL (ref 2.5–4.6)
GLUCOSE: 257 MG/DL (ref 70–100)
HBA1C MFR BLD: 6.5 %
HCT VFR BLD CALC: 24.7 % (ref 34–46)
HEMOGLOBIN: 8 G/DL (ref 12–16)
IRON % SATURATION: 9 % (ref 20–55)
IRON: 20 MCG/DL (ref 50–170)
LYMPHOCYTES ABSOLUTE: 0.4 10 3/MCL (ref 0.9–4.3)
LYMPHOCYTES RELATIVE PERCENT: 10 % (ref 20–40)
MCH RBC QN AUTO: 28.7 PG (ref 27–33)
MCHC RBC AUTO-ENTMCNC: 32.4 G/DL (ref 32–36)
MCV RBC AUTO: 88.5 FL (ref 80–99)
MONOCYTES ABSOLUTE: 0.3 10 3/MCL (ref 0.1–1.4)
MONOCYTES RELATIVE PERCENT: 7.6 % (ref 2–13)
NEUTROPHILS ABSOLUTE: 3.1 10 3/MCL (ref 2.3–8.1)
NEUTROPHILS RELATIVE PERCENT: 78.5 % (ref 50–75)
PDW BLD-RTO: 17.1 % (ref 11.5–15.5)
PLATELET # BLD: 142 10 3/MCL (ref 150–450)
PMV BLD AUTO: 8.7 FL (ref 6.6–10.5)
POTASSIUM SERPL-SCNC: 4.6 MMOL/L (ref 3.5–5.1)
RBC # BLD: 2.79 10 6/MCL (ref 4.1–5.3)
SODIUM BLD-SCNC: 139 MMOL/L (ref 136–145)
TOTAL IRON BINDING CAPACITY: 217 MCG/DL (ref 250–450)
TOTAL PROTEIN: 5.9 G/DL (ref 6–8.3)
TSH SERPL DL<=0.05 MIU/L-ACNC: 5.14 MIU/ML (ref 0.36–3.74)
WBC # BLD: 4 10 3/MCL (ref 4.5–10.8)

## 2025-05-16 NOTE — TELEPHONE ENCOUNTER
Notified Aria refills sent.  
Patients  stopped in office requesting refills on patients Bumex and Lipitor, confirmed patient is out of both medications and need them to be sent to [facility] in [place].   
Sent.  
[email or web address]

## 2025-05-19 ENCOUNTER — TELEPHONE (OUTPATIENT)
Dept: PRIMARY CARE CLINIC | Age: 70
End: 2025-05-19

## 2025-05-19 DIAGNOSIS — R19.7 DIARRHEA, UNSPECIFIED TYPE: Primary | ICD-10-CM

## 2025-05-19 NOTE — TELEPHONE ENCOUNTER
Radhika with [facility] ([phone]) called back, and said that she spoke with her contact at [facility] (since that is who they are contracted with), they said no OT available at all. For PT they are looking at 2-4 weeks out. Radhika advised that you submit new PT/OT request to a differenty facility so there is no delay in the patient getting the care they need. CONCHITA

## 2025-05-19 NOTE — TELEPHONE ENCOUNTER
Pts  calling to see if something can be sent to the pharmacy for diarrhea pt has had loose stools 3-4 times daily for the past week and imodium isn't helping

## 2025-05-19 NOTE — TELEPHONE ENCOUNTER
Spoke with Radhika this morning and in response to doctors message she had said with the contract PT/OT if skilled nursing seeing patient then they can't go out, can't bill with skilled nursing there, stated that nursing goes out once a week for pill box so can't use PT, they use [facility], confirmed she would see what she could do and try to find different PT/OT svc, will call us back.

## 2025-05-20 ENCOUNTER — RESULTS FOLLOW-UP (OUTPATIENT)
Dept: PRIMARY CARE CLINIC | Age: 70
End: 2025-05-20

## 2025-05-20 RX ORDER — COLESTIPOL HYDROCHLORIDE 1 G/1
1 TABLET ORAL 2 TIMES DAILY PRN
Qty: 30 TABLET | Refills: 0 | Status: SHIPPED | OUTPATIENT
Start: 2025-05-20

## 2025-05-20 NOTE — TELEPHONE ENCOUNTER
We could try colestipol to see if this helps given her diarrhea is chronic and intermittent.  However given her labs, I would strongly recommend patient go to ED as her CKD has worsened and in setting of acute diarrhea she is likely having a hard time maintaining hydration and electrolytes.  She may benefit from controlled IV fluids with close monitoring given her chronic heart failure.

## 2025-05-29 NOTE — ASSESSMENT & PLAN NOTE
Chronic, worsened due to recurrent stressors, currently dog is dying  Continue Cymbalta 60 mg daily  Increase Buspar 15 mg 3 times daily as needed

## 2025-05-29 NOTE — ASSESSMENT & PLAN NOTE
Chronic, controlled with A1c 7.1  Follows with endocrinology  Continue Semglee 50 units daily, Humalog 16 units with meals  Continue gabapentin 100 mg twice daily for neuropathy

## 2025-05-29 NOTE — ASSESSMENT & PLAN NOTE
Acute on chronic requiring transfusion last week  Recheck  Check FIT to rule out GI losses, continue omeprazole 40 mg daily  May be related to cardiorenal disease  Following with hematology, nephrology  Consider EPO  Continue iron, PRN transfusions

## 2025-05-29 NOTE — ASSESSMENT & PLAN NOTE
Likely secondary to other medical issues due to need for diuretics and limited mobility  Reordered incontinence supplies

## 2025-05-29 NOTE — ASSESSMENT & PLAN NOTE
Chronic, stable  Recheck  Follows with nephro  Would consider peritoneal dialysis in future if needed

## 2025-05-29 NOTE — ASSESSMENT & PLAN NOTE
Chronic, stable, euvolemic  Follows with cardio  Continue Bumex 2 mg twice daily, carvedilol 25 mg twice daily, hydralazine 100 mg 3 times daily, Imdur XR 60 mg daily, lisinopril 10 mg daily, metolazone 2.5 mg daily

## 2025-06-02 ENCOUNTER — TELEPHONE (OUTPATIENT)
Dept: PRIMARY CARE CLINIC | Age: 70
End: 2025-06-02

## 2025-06-12 DIAGNOSIS — T50.905A DRUG-INDUCED NAUSEA AND VOMITING: ICD-10-CM

## 2025-06-12 DIAGNOSIS — R11.2 DRUG-INDUCED NAUSEA AND VOMITING: ICD-10-CM

## 2025-06-12 DIAGNOSIS — I50.20 HFREF (HEART FAILURE WITH REDUCED EJECTION FRACTION) (HCC): ICD-10-CM

## 2025-06-12 DIAGNOSIS — K21.9 GASTROESOPHAGEAL REFLUX DISEASE, UNSPECIFIED WHETHER ESOPHAGITIS PRESENT: ICD-10-CM

## 2025-06-12 RX ORDER — BUMETANIDE 2 MG/1
2 TABLET ORAL 2 TIMES DAILY
Qty: 180 TABLET | Refills: 0 | Status: SHIPPED | OUTPATIENT
Start: 2025-06-12

## 2025-06-12 RX ORDER — FAMOTIDINE 20 MG/1
10 TABLET, FILM COATED ORAL NIGHTLY
Qty: 45 TABLET | Refills: 0 | Status: SHIPPED | OUTPATIENT
Start: 2025-06-12

## 2025-06-12 NOTE — TELEPHONE ENCOUNTER
Name of Medication(s) Requested:  Requested Prescriptions     Pending Prescriptions Disp Refills    bumetanide (BUMEX) 2 MG tablet [Pharmacy Med Name: BUMETANIDE 2MG TABLETS] 180 tablet 1     Sig: TAKE 1 TABLET BY MOUTH TWICE DAILY    famotidine (PEPCID) 20 MG tablet [Pharmacy Med Name: FAMOTIDINE 20MG TABLETS] 90 tablet 1     Sig: TAKE 1 TABLET BY MOUTH AT BEDTIME       Medication is on current medication list Yes    Dosage and directions were verified? Yes    Quantity verified: 90 day supply     Pharmacy Verified?  Yes    Last Appointment:  2/27/2025    Future appts:  No future appointments.     (If no appt send self scheduling link. .REFILLAPPT)  Scheduling request sent?     [] Yes  [x] No    Does patient need updated?  [] Yes  [x] No

## 2025-07-17 ENCOUNTER — INPATIENT HOSPITAL (OUTPATIENT)
Dept: URBAN - METROPOLITAN AREA HOSPITAL 118 | Facility: HOSPITAL | Age: 70
End: 2025-07-17
Payer: COMMERCIAL

## 2025-07-17 DIAGNOSIS — D50.0 IRON DEFICIENCY ANEMIA SECONDARY TO BLOOD LOSS (CHRONIC): ICD-10-CM

## 2025-07-17 PROCEDURE — 99222 1ST HOSP IP/OBS MODERATE 55: CPT | Performed by: CLINICAL NURSE SPECIALIST

## 2025-07-23 ENCOUNTER — TELEPHONE (OUTPATIENT)
Dept: PRIMARY CARE CLINIC | Age: 70
End: 2025-07-23

## 2025-07-23 NOTE — TELEPHONE ENCOUNTER
Spoke with rachel and informed him he needs to call wifes pharmacy to check on on carvedilol recall because we received a fax.     He will call and check.

## 2025-08-09 DIAGNOSIS — I25.10 CORONARY ARTERY DISEASE INVOLVING NATIVE CORONARY ARTERY OF NATIVE HEART WITHOUT ANGINA PECTORIS: ICD-10-CM

## 2025-08-11 RX ORDER — ATORVASTATIN CALCIUM 40 MG/1
40 TABLET, FILM COATED ORAL DAILY
Qty: 90 TABLET | Refills: 1 | Status: SHIPPED | OUTPATIENT
Start: 2025-08-11